# Patient Record
Sex: MALE | Race: WHITE | NOT HISPANIC OR LATINO | Employment: OTHER | URBAN - METROPOLITAN AREA
[De-identification: names, ages, dates, MRNs, and addresses within clinical notes are randomized per-mention and may not be internally consistent; named-entity substitution may affect disease eponyms.]

---

## 2017-03-14 ENCOUNTER — OFFICE VISIT (OUTPATIENT)
Dept: CARDIOLOGY | Facility: CLINIC | Age: 62
End: 2017-03-14

## 2017-03-14 VITALS
BODY MASS INDEX: 31.94 KG/M2 | HEART RATE: 64 BPM | DIASTOLIC BLOOD PRESSURE: 78 MMHG | SYSTOLIC BLOOD PRESSURE: 120 MMHG | WEIGHT: 241 LBS | HEIGHT: 73 IN

## 2017-03-14 DIAGNOSIS — I44.7 LBBB (LEFT BUNDLE BRANCH BLOCK): ICD-10-CM

## 2017-03-14 DIAGNOSIS — I48.19 PERSISTENT ATRIAL FIBRILLATION (HCC): Primary | ICD-10-CM

## 2017-03-14 DIAGNOSIS — R06.02 SHORTNESS OF BREATH: ICD-10-CM

## 2017-03-14 PROCEDURE — 93000 ELECTROCARDIOGRAM COMPLETE: CPT | Performed by: INTERNAL MEDICINE

## 2017-03-14 PROCEDURE — 99214 OFFICE O/P EST MOD 30 MIN: CPT | Performed by: INTERNAL MEDICINE

## 2017-03-17 NOTE — PROGRESS NOTES
Date of Office Visit: 2017  Encounter Provider: Catrachito Reynolds MD  Place of Service: [facility] CARDIOLOGY  Patient Name: Anjum Palma Jr  :1955    Chief complaint: Follow-up for persistent atrial fibrillation, LBBB    History of Present Illness:    Dear Vishnu:      I again had the pleasure of seeing your patient in cardiology office on 2017. As  you well know, he is a very pleasant 61 year-old white male with a past medical history  significant for pesistent atrial fibrillation and hypertension who presents for follow-up.  The patient was found to have an abnormal EKG during a routine physical on   2012.  He subsequently presented for an outpatient Cardiolite stress test on   2012. At that time, his heart rate was irregular and he was found to be in atrial   fibrillation by EKG.  He was completely unaware that he was in atrial fibrillation, and   was essentially asymptomatic. He did complete his stress test at that time, and the   scintigraphic portion showed no evidence of ischemia or infarction. He was initiated   on Xarelto, and saw me in the office on 2012 after his stress test. He was also   placed on Toprol XL, and subsequently underwent a transesophageal echocardiogram   on 2012. At the time of his VIOLET, he was noted to be in normal sinus rhythm. His   transesophageal echocardiogram showed mild septal hypokinesis and an ejection   fraction of 55-60%. He did not have any significant valvular anomalies and his left atrial   size was normal.      The patient presented back in atrial fibrillation at his office visit on 2013. At that   time, I advised him to undergo another transesophageal echocardiogram. He underwent  this examination on 2013 and it was negative for any thrombus formation. He was  cardioverted with a single 150 joules shock on a biphasic defibrillator. He was placed on  amiodarone for a short time  afterwards and was transitioned to Multaq. He again reverted  back to atrial fibrillation afterwards.      The patient presents today for follow-up.  He does state that while he was moving   furniture with his son recently, he did feel short of breath.  He has some mild shortness   of breath with heavy exertion.  However, he can still work out without significant difficulty.    He has not had any chest discomfort.  His EKG from today does show that he has   progressed to a full left bundle branch block.  He is still taking all his medications without   difficulty.    Past Medical History   Diagnosis Date   • Degeneration of cervical intervertebral disc    • Hypertension    • LBBB (left bundle branch block)      Diagnosed 3/14/17   • Nephrolithiasis      Recurrent, s/p multiple lithotripsy procedures   • CHARLY on CPAP    • Osteoarthritis    • Persistent atrial fibrillation    • Prostate cancer      Early stage prostate cancer diagnosed late 2014.  Completed 43 radiation treatments on 5/18/15.   • Seasonal allergic reaction        Past Surgical History   Procedure Laterality Date   • Other surgical history       Multiple Lithotripsy procedures       Current Outpatient Prescriptions on File Prior to Visit   Medication Sig Dispense Refill   • cyclobenzaprine (FLEXERIL) 10 MG tablet Take 1 tablet by mouth daily as needed.     • flunisolide (NASALIDE) 25 MCG/ACT (0.025%) solution nasal spray into each nostril.     • hydrochlorothiazide (HYDRODIURIL) 25 MG tablet Take 1 tablet by mouth daily.     • levocetirizine (XYZAL) 5 MG tablet Take 1 tablet by mouth daily.     • lidocaine (LIDODERM) 5 % Apply topically. Apply as directed     • metoprolol succinate XL (TOPROL-XL) 25 MG 24 hr tablet Take 25 mg by mouth daily.     • montelukast (SINGULAIR) 10 MG tablet Take 1 tablet by mouth daily.     • potassium citrate (UROCIT-K) 10 MEQ (1080 MG) CR tablet Take 10 mEq by mouth 3 (three) times a day with meals.     •  "trandolapril-verapamil (TARKA) 4-240 MG per CR tablet Take 1 tablet by mouth daily.     • XARELTO 20 MG tablet TAKE 1 TABLET DAILY WITH   DINNER 90 tablet 2     No current facility-administered medications on file prior to visit.      Allergies as of 03/14/2017   • (No Known Allergies)     Social History     Social History   • Marital status:      Spouse name: N/A   • Number of children: N/A   • Years of education: N/A     Occupational History   • Not on file.     Social History Main Topics   • Smoking status: Never Smoker   • Smokeless tobacco: Never Used   • Alcohol use Yes      Comment: Cut back on use significantly   • Drug use: No   • Sexual activity: Not on file     Other Topics Concern   • Not on file     Social History Narrative     Family History   Problem Relation Age of Onset   • Atrial fibrillation Mother       also s/p ICD placement   • Coronary artery disease Father      Father with CABG in his 50's, and had subsequent coronary stent placement.   • Uterine cancer Sister    • Heart failure Paternal Uncle        Review of Systems   Cardiovascular: Positive for dyspnea on exertion.   Musculoskeletal: Positive for joint pain.   Genitourinary: Positive for frequency.   All other systems reviewed and are negative.    Objective:     Vitals:    03/14/17 1127   BP: 120/78   Pulse: 64   Weight: 241 lb (109 kg)   Height: 73\" (185.4 cm)     Body mass index is 31.8 kg/(m^2).    Physical Exam   Constitutional: He is oriented to person, place, and time. He appears well-developed and well-nourished.   HENT:   Head: Normocephalic and atraumatic.   Eyes: Conjunctivae are normal.   Neck: Neck supple.   Cardiovascular: Normal rate.  An irregularly irregular rhythm present. Exam reveals no gallop and no friction rub.    No murmur heard.  Pulmonary/Chest: Effort normal and breath sounds normal.   Abdominal: Soft. There is no tenderness.   Musculoskeletal: He exhibits no edema.   Neurological: He is alert and oriented " to person, place, and time.   Skin: Skin is warm.   Psychiatric: He has a normal mood and affect. His behavior is normal.     Lab Review:     ECG 12 Lead  Date/Time: 3/14/2017 11:31 AM  Performed by: MAVIS RAINEY  Authorized by: MAVIS RAINEY   Comparison: compared with previous ECG from 12/19/2014  Comparison to previous ECG: LBBB new compared to old EKG  Rhythm: atrial fibrillation  Rate: normal  BPM: 64  Conduction: left bundle branch block  Clinical impression: abnormal ECG            Assessment:       Diagnosis Plan   1. Persistent atrial fibrillation  Adult Transthoracic Echo Complete   2. Shortness of breath  Adult Transthoracic Echo Complete   3. LBBB (left bundle branch block)  Adult Transthoracic Echo Complete     Plan:       Overall, the patient seems to be doing well.  He did have some shortness of breath while   moving furniture with his son recently, although he continues to be able to work out without   difficulty.  He has not had any chest discomfort his blood pressure is 120/78, and he states   that his heart rate is consistently in the 60s to 70s.  He is going to remain on the Toprol-XL   at 25 mg once a day, as well as the Tarka.  He is taking the Xarelto without any difficulty as   well, and has been on this for quite some time.  He has progressed to a full left bundle   branch block on his EKG.  He has a history of a left anterior fascicular block in the past.  I   am going to recheck an echocardiogram at this time as he has not had a study in quite  some time, and now has a new left bundle branch block on his EKG.  If he has any issues,   he will notify me.  Otherwise, I will see him back in 6 months.

## 2017-03-20 ENCOUNTER — HOSPITAL ENCOUNTER (OUTPATIENT)
Dept: CARDIOLOGY | Facility: HOSPITAL | Age: 62
Discharge: HOME | End: 2017-03-20
Attending: INTERNAL MEDICINE | Admitting: INTERNAL MEDICINE

## 2017-03-20 ENCOUNTER — TELEPHONE (OUTPATIENT)
Dept: CARDIOLOGY | Facility: CLINIC | Age: 62
End: 2017-03-20

## 2017-03-20 VITALS
SYSTOLIC BLOOD PRESSURE: 170 MMHG | HEIGHT: 73 IN | BODY MASS INDEX: 31.94 KG/M2 | WEIGHT: 241 LBS | HEART RATE: 75 BPM | DIASTOLIC BLOOD PRESSURE: 100 MMHG

## 2017-03-20 DIAGNOSIS — I44.7 LBBB (LEFT BUNDLE BRANCH BLOCK): ICD-10-CM

## 2017-03-20 DIAGNOSIS — R06.02 SHORTNESS OF BREATH: ICD-10-CM

## 2017-03-20 DIAGNOSIS — I48.19 PERSISTENT ATRIAL FIBRILLATION (HCC): ICD-10-CM

## 2017-03-20 LAB
ASCENDING AORTA: 4.1 CM
BH CV ECHO MEAS - ACS: 2.2 CM
BH CV ECHO MEAS - AO MAX PG (FULL): 3.4 MMHG
BH CV ECHO MEAS - AO MAX PG: 6.8 MMHG
BH CV ECHO MEAS - AO MEAN PG (FULL): 2.2 MMHG
BH CV ECHO MEAS - AO MEAN PG: 4.2 MMHG
BH CV ECHO MEAS - AO ROOT AREA (BSA CORRECTED): 2
BH CV ECHO MEAS - AO ROOT AREA: 17.1 CM^2
BH CV ECHO MEAS - AO ROOT DIAM: 4.7 CM
BH CV ECHO MEAS - AO V2 MAX: 129.9 CM/SEC
BH CV ECHO MEAS - AO V2 MEAN: 98.5 CM/SEC
BH CV ECHO MEAS - AO V2 VTI: 27.4 CM
BH CV ECHO MEAS - AVA(I,A): 2.3 CM^2
BH CV ECHO MEAS - AVA(I,D): 2.3 CM^2
BH CV ECHO MEAS - AVA(V,A): 2.7 CM^2
BH CV ECHO MEAS - AVA(V,D): 2.7 CM^2
BH CV ECHO MEAS - BSA(HAYCOCK): 2.4 M^2
BH CV ECHO MEAS - BSA: 2.3 M^2
BH CV ECHO MEAS - BZI_BMI: 31.8 KILOGRAMS/M^2
BH CV ECHO MEAS - BZI_METRIC_HEIGHT: 185.4 CM
BH CV ECHO MEAS - BZI_METRIC_WEIGHT: 109.3 KG
BH CV ECHO MEAS - CONTRAST EF (2CH): 58.1 ML/M^2
BH CV ECHO MEAS - CONTRAST EF 4CH: 55.6 ML/M^2
BH CV ECHO MEAS - EDV(MOD-SP2): 129 ML
BH CV ECHO MEAS - EDV(MOD-SP4): 135 ML
BH CV ECHO MEAS - EDV(TEICH): 192.3 ML
BH CV ECHO MEAS - EF(CUBED): 64.4 %
BH CV ECHO MEAS - EF(MOD-SP2): 58.1 %
BH CV ECHO MEAS - EF(MOD-SP4): 57 %
BH CV ECHO MEAS - EF(TEICH): 54.9 %
BH CV ECHO MEAS - ESV(MOD-SP2): 54 ML
BH CV ECHO MEAS - ESV(MOD-SP4): 60 ML
BH CV ECHO MEAS - ESV(TEICH): 86.7 ML
BH CV ECHO MEAS - FS: 29.1 %
BH CV ECHO MEAS - IVS/LVPW: 1.1
BH CV ECHO MEAS - IVSD: 1.2 CM
BH CV ECHO MEAS - LAT PEAK E' VEL: 9 CM/SEC
BH CV ECHO MEAS - LV DIASTOLIC VOL/BSA (35-75): 58 ML/M^2
BH CV ECHO MEAS - LV MASS(C)D: 328.8 GRAMS
BH CV ECHO MEAS - LV MASS(C)DI: 141.2 GRAMS/M^2
BH CV ECHO MEAS - LV MAX PG: 3.3 MMHG
BH CV ECHO MEAS - LV MEAN PG: 2 MMHG
BH CV ECHO MEAS - LV SYSTOLIC VOL/BSA (12-30): 25.8 ML/M^2
BH CV ECHO MEAS - LV V1 MAX: 91.2 CM/SEC
BH CV ECHO MEAS - LV V1 MEAN: 68.7 CM/SEC
BH CV ECHO MEAS - LV V1 VTI: 16.9 CM
BH CV ECHO MEAS - LVIDD: 6.2 CM
BH CV ECHO MEAS - LVIDS: 4.4 CM
BH CV ECHO MEAS - LVLD AP2: 7.6 CM
BH CV ECHO MEAS - LVLD AP4: 7.2 CM
BH CV ECHO MEAS - LVLS AP2: 6.6 CM
BH CV ECHO MEAS - LVLS AP4: 5.9 CM
BH CV ECHO MEAS - LVOT AREA (M): 3.8 CM^2
BH CV ECHO MEAS - LVOT AREA: 3.8 CM^2
BH CV ECHO MEAS - LVOT DIAM: 2.2 CM
BH CV ECHO MEAS - LVPWD: 1.2 CM
BH CV ECHO MEAS - MED PEAK E' VEL: 5 CM/SEC
BH CV ECHO MEAS - MR MAX PG: 64.1 MMHG
BH CV ECHO MEAS - MR MAX VEL: 400.4 CM/SEC
BH CV ECHO MEAS - MV DEC SLOPE: 390.7 CM/SEC^2
BH CV ECHO MEAS - MV DEC TIME: 0.21 SEC
BH CV ECHO MEAS - MV E MAX VEL: 84.9 CM/SEC
BH CV ECHO MEAS - MV MAX PG: 6 MMHG
BH CV ECHO MEAS - MV MEAN PG: 2.6 MMHG
BH CV ECHO MEAS - MV P1/2T MAX VEL: 83.4 CM/SEC
BH CV ECHO MEAS - MV P1/2T: 62.5 MSEC
BH CV ECHO MEAS - MV V2 MAX: 122.7 CM/SEC
BH CV ECHO MEAS - MV V2 MEAN: 71.3 CM/SEC
BH CV ECHO MEAS - MV V2 VTI: 15.6 CM
BH CV ECHO MEAS - MVA P1/2T LCG: 2.6 CM^2
BH CV ECHO MEAS - MVA(P1/2T): 3.5 CM^2
BH CV ECHO MEAS - MVA(VTI): 4.1 CM^2
BH CV ECHO MEAS - PA MAX PG (FULL): 0.95 MMHG
BH CV ECHO MEAS - PA MAX PG: 1.9 MMHG
BH CV ECHO MEAS - PA V2 MAX: 69.5 CM/SEC
BH CV ECHO MEAS - PVA(V,A): 3.5 CM^2
BH CV ECHO MEAS - PVA(V,D): 3.5 CM^2
BH CV ECHO MEAS - QP/QS: 0.64
BH CV ECHO MEAS - RAP SYSTOLE: 15 MMHG
BH CV ECHO MEAS - RV MAX PG: 0.98 MMHG
BH CV ECHO MEAS - RV MEAN PG: 0.48 MMHG
BH CV ECHO MEAS - RV V1 MAX: 49.5 CM/SEC
BH CV ECHO MEAS - RV V1 MEAN: 31.3 CM/SEC
BH CV ECHO MEAS - RV V1 VTI: 8.4 CM
BH CV ECHO MEAS - RVOT AREA: 4.9 CM^2
BH CV ECHO MEAS - RVOT DIAM: 2.5 CM
BH CV ECHO MEAS - RVSP: 42 MMHG
BH CV ECHO MEAS - SI(AO): 201 ML/M^2
BH CV ECHO MEAS - SI(CUBED): 65.1 ML/M^2
BH CV ECHO MEAS - SI(LVOT): 27.5 ML/M^2
BH CV ECHO MEAS - SI(MOD-SP2): 32.2 ML/M^2
BH CV ECHO MEAS - SI(MOD-SP4): 32.2 ML/M^2
BH CV ECHO MEAS - SI(TEICH): 45.3 ML/M^2
BH CV ECHO MEAS - SUP REN AO DIAM: 2.7 CM
BH CV ECHO MEAS - SV(AO): 468.2 ML
BH CV ECHO MEAS - SV(CUBED): 151.7 ML
BH CV ECHO MEAS - SV(LVOT): 64 ML
BH CV ECHO MEAS - SV(MOD-SP2): 75 ML
BH CV ECHO MEAS - SV(MOD-SP4): 75 ML
BH CV ECHO MEAS - SV(RVOT): 40.8 ML
BH CV ECHO MEAS - SV(TEICH): 105.6 ML
BH CV ECHO MEAS - TAPSE (>1.6): 2.6 CM2
BH CV ECHO MEAS - TR MAX VEL: 259.9 CM/SEC
BH CV XLRA - RV BASE: 3.8 CM
BH CV XLRA - TDI S': 12 CM/SEC
E/E' RATIO: 14
LEFT ATRIUM VOLUME INDEX: 43 ML/M2
SINUS: 4.3 CM
STJ: 3.1 CM

## 2017-03-20 PROCEDURE — 0399T HC MYOCARDL STRAIN IMAG QUAN ASSMT PER SESS: CPT

## 2017-03-20 PROCEDURE — 76376 3D RENDER W/INTRP POSTPROCES: CPT | Performed by: INTERNAL MEDICINE

## 2017-03-20 PROCEDURE — 0399T ADULT TRANSTHORACIC ECHO COMPLETE: CPT | Performed by: INTERNAL MEDICINE

## 2017-03-20 PROCEDURE — 93306 TTE W/DOPPLER COMPLETE: CPT | Performed by: INTERNAL MEDICINE

## 2017-03-20 PROCEDURE — 93306 TTE W/DOPPLER COMPLETE: CPT

## 2017-03-20 NOTE — TELEPHONE ENCOUNTER
Pt called  He missed your call about echo results.   Pt's call back # [phone]   Thanks Mani LOPEZ

## 2017-09-06 RX ORDER — RIVAROXABAN 20 MG/1
TABLET, FILM COATED ORAL
Qty: 90 TABLET | Refills: 3 | Status: SHIPPED | OUTPATIENT
Start: 2017-09-06 | End: 2018-07-31 | Stop reason: SDUPTHER

## 2017-09-11 ENCOUNTER — OFFICE VISIT (OUTPATIENT)
Dept: CARDIOLOGY | Facility: CLINIC | Age: 62
End: 2017-09-11

## 2017-09-11 VITALS
OXYGEN SATURATION: 98 % | WEIGHT: 243 LBS | HEART RATE: 74 BPM | SYSTOLIC BLOOD PRESSURE: 120 MMHG | HEIGHT: 73 IN | DIASTOLIC BLOOD PRESSURE: 82 MMHG | BODY MASS INDEX: 32.2 KG/M2

## 2017-09-11 DIAGNOSIS — I48.19 PERSISTENT ATRIAL FIBRILLATION (HCC): Primary | ICD-10-CM

## 2017-09-11 DIAGNOSIS — I44.7 LEFT BUNDLE BRANCH BLOCK: ICD-10-CM

## 2017-09-11 PROCEDURE — 99213 OFFICE O/P EST LOW 20 MIN: CPT | Performed by: INTERNAL MEDICINE

## 2017-09-11 RX ORDER — TAMSULOSIN HYDROCHLORIDE 0.4 MG/1
0.4 CAPSULE ORAL DAILY
COMMUNITY

## 2017-09-11 RX ORDER — SENNOSIDES 8.6 MG
1300 CAPSULE ORAL 2 TIMES DAILY
COMMUNITY

## 2017-09-17 NOTE — PROGRESS NOTES
Date of Office Visit: 2017  Encounter Provider: Catrachito Reynolds MD  Place of Service: [facility] CARDIOLOGY  Patient Name: Anjum Palma Jr  :1955    Chief complaint: Follow-up for persistent atrial fibrillation, LBBB    History of Present Illness:    Dear Vishnu:       I again had the pleasure of seeing your patient in cardiology office on 2017. As  you well know, he is a very pleasant 62 year-old white male with a past medical history  significant for pesistent atrial fibrillation and hypertension who presents for follow-up.  The patient was found to have an abnormal EKG during a routine physical on   2012.  He subsequently presented for an outpatient Cardiolite stress test on   2012. At that time, his heart rate was irregular and he was found to be in atrial   fibrillation by EKG.  He was completely unaware that he was in atrial fibrillation, and   was essentially asymptomatic. He did complete his stress test at that time, and the   scintigraphic portion showed no evidence of ischemia or infarction. He was initiated   on Xarelto, and saw me in the office on 2012 after his stress test. He was also   placed on Toprol XL, and subsequently underwent a transesophageal echocardiogram   on 2012. At the time of his VIOLET, he was noted to be in normal sinus rhythm. His   transesophageal echocardiogram showed mild septal hypokinesis and an ejection   fraction of 55-60%. He did not have any significant valvular anomalies and his left atrial   size was normal.       The patient presented back in atrial fibrillation at his office visit on 2013. At that   time, I advised him to undergo another transesophageal echocardiogram. He underwent  this examination on 2013 and it was negative for any thrombus formation. He was  cardioverted with a single 150 joules shock on a biphasic defibrillator. He was placed on  amiodarone for a short time  afterwards and was transitioned to Multaq. He again reverted  back to atrial fibrillation afterwards.       The patient presents today for follow-up.  He seems to be doing well.  He is working out   on an elliptical machine frequently, and is having no issues with this.  He is had no chest   pain or shortness of breath.  He remains in atrial fibrillation which is well-controlled.  He   is taking the Xarelto without any bleeding complications.    Past Medical History:   Diagnosis Date   • Degeneration of cervical intervertebral disc    • Hypertension    • LBBB (left bundle branch block)     Diagnosed 3/14/17   • Nephrolithiasis     Recurrent, s/p multiple lithotripsy procedures   • CHARLY on CPAP    • Osteoarthritis    • Persistent atrial fibrillation    • Prostate cancer     Early stage prostate cancer diagnosed late 2014.  Completed 43 radiation treatments on 5/18/15.   • Seasonal allergic reaction        Past Surgical History:   Procedure Laterality Date   • OTHER SURGICAL HISTORY      Multiple Lithotripsy procedures       Current Outpatient Prescriptions on File Prior to Visit   Medication Sig Dispense Refill   • cyclobenzaprine (FLEXERIL) 10 MG tablet Take 1 tablet by mouth daily as needed.     • flunisolide (NASALIDE) 25 MCG/ACT (0.025%) solution nasal spray into each nostril.     • Glucos-Chondroit-Hyaluron-MSM (GLUCOSAMINE CHONDROITIN JOINT PO) Take  by mouth.     • hydrochlorothiazide (HYDRODIURIL) 25 MG tablet Take 1 tablet by mouth daily.     • levocetirizine (XYZAL) 5 MG tablet Take 1 tablet by mouth daily.     • metoprolol succinate XL (TOPROL-XL) 25 MG 24 hr tablet Take 25 mg by mouth daily.     • montelukast (SINGULAIR) 10 MG tablet Take 1 tablet by mouth daily.     • potassium citrate (UROCIT-K) 10 MEQ (1080 MG) CR tablet Take 10 mEq by mouth 3 (three) times a day with meals.     • trandolapril-verapamil (TARKA) 4-240 MG per CR tablet Take 1 tablet by mouth daily.     • XARELTO 20 MG tablet Take 1 tablet  "by mouth  daily with dinner 90 tablet 3     No current facility-administered medications on file prior to visit.      Allergies as of 09/11/2017   • (No Known Allergies)     Social History     Social History   • Marital status:      Spouse name: N/A   • Number of children: N/A   • Years of education: N/A     Occupational History   • Not on file.     Social History Main Topics   • Smoking status: Never Smoker   • Smokeless tobacco: Never Used   • Alcohol use Yes      Comment: Cut back on use significantly   • Drug use: No   • Sexual activity: Not on file     Other Topics Concern   • Not on file     Social History Narrative     Family History   Problem Relation Age of Onset   • Atrial fibrillation Mother       also s/p ICD placement   • Coronary artery disease Father      Father with CABG in his 50's, and had subsequent coronary stent placement.   • Uterine cancer Sister    • Heart failure Paternal Uncle        Review of Systems   HENT: Positive for sore throat.    Respiratory: Positive for cough.    All other systems reviewed and are negative.    Objective:     Vitals:    09/11/17 1117   BP: 120/82   Pulse: 74   SpO2: 98%   Weight: 243 lb (110 kg)   Height: 73\" (185.4 cm)     Body mass index is 32.06 kg/(m^2).    Physical Exam   Constitutional: He is oriented to person, place, and time. He appears well-developed and well-nourished.   HENT:   Head: Normocephalic and atraumatic.   Eyes: Conjunctivae are normal.   Neck: Neck supple.   Cardiovascular: Normal rate.  An irregularly irregular rhythm present. Exam reveals no gallop and no friction rub.    No murmur heard.  Pulmonary/Chest: Effort normal and breath sounds normal.   Abdominal: Soft. There is no tenderness.   Musculoskeletal: He exhibits no edema.   Neurological: He is alert and oriented to person, place, and time.   Skin: Skin is warm.   Psychiatric: He has a normal mood and affect. His behavior is normal.     Lab Review:   Procedures    Cardiac " Procedures:  1.  Echocardiogram on 3/20/2017: The left ventricle was mildly dilated.  There was mild   LVH.  Ejection fraction was 50-55%.  There was mild tricuspid regurgitation.  The left   atrium was moderately dilated.  The right atrium was moderately to severely dilated.    Assessment:       Diagnosis Plan   1. Persistent atrial fibrillation     2. Left bundle branch block       Plan:       The patient seems to be stable from a cardiac standpoint this time.  He has had no new   symptoms.  He remains asymptomatic with regards to the atrial fibrillation.  He is taking   the Toprol-XL at 25 mg per day, and his rate is well-controlled.  He will continue on the   Xarelto at 20 mg per day, and he has had no bleeding complications.  His   echocardiogram from 3/20/2017 showed low normal function at 50-55% ejection   fraction, and no significant valvular disease.  For now, I did not change any of his   medications.  I will see him back in the office within the next 6 months unless other   issues arise.    Atrial Fibrillation and Atrial Flutter  Assessment  • The patient has persistent atrial fibrillation  • This is non-valvular in etiology  • The patient's CHADS2-VASc score is 1  • A [id number] score of 1 is considered an intermediate risk for a thromboembolic event  • Rivaroxaban prescribed    Plan  • Continue in atrial fibrillation with rate control  • Continue rivaroxaban for antithrombotic therapy, bleeding issues discussed  • Continue beta blocker for rate control    Subjective - Objective  • The average duration of atrial fibrillation episodes is >3 months

## 2017-11-21 ENCOUNTER — OFFICE VISIT (OUTPATIENT)
Dept: SLEEP MEDICINE | Facility: HOSPITAL | Age: 62
End: 2017-11-21
Attending: PSYCHIATRY & NEUROLOGY

## 2017-11-21 VITALS
OXYGEN SATURATION: 96 % | HEART RATE: 76 BPM | BODY MASS INDEX: 32.6 KG/M2 | SYSTOLIC BLOOD PRESSURE: 158 MMHG | WEIGHT: 246 LBS | DIASTOLIC BLOOD PRESSURE: 107 MMHG | HEIGHT: 73 IN

## 2017-11-21 DIAGNOSIS — G47.33 OSA (OBSTRUCTIVE SLEEP APNEA): Primary | ICD-10-CM

## 2017-11-21 PROCEDURE — G0463 HOSPITAL OUTPT CLINIC VISIT: HCPCS

## 2017-11-21 NOTE — PROGRESS NOTES
Anjum Palma   :1955   [id number]      DATE OF SERVICE: 2017     HISTORY: The patient is a 62 y.o. white male with moderately severe obstructive sleep apnea syndrome.     Polysomnography in the past has revealed apnea-hypopnea index of AHI 24.2 per sleep hour with minimum SpO2 of 70%. He is on auto CPAP therapy. During supine position he has very severe obstructive sleep apnea syndrome with Supine AHI of 30.3, in nonsupine position 3.5 per sleep hour, and during REM sleep AHI of 68.1 per sleep hour. He also has periodic limb movement disorder.     Now has a correct fitting mask and using CPAP therapy very regularly. Compliance data indicates excellent compliance with CPAP pressure of 9 cm of water with an average usage 7 hours and 14 minutes and using 100% of the time for more than 4 hours. The patient is losing weight. His [place] Sleepiness Scale score is 4. He is compliant and benefiting from it.     Past Medical History:   Diagnosis Date   • Degeneration of cervical intervertebral disc    • Hypertension    • LBBB (left bundle branch block)     Diagnosed 3/14/17   • Nephrolithiasis     Recurrent, s/p multiple lithotripsy procedures   • CHARLY on CPAP    • Osteoarthritis    • Persistent atrial fibrillation    • Prostate cancer     Early stage prostate cancer diagnosed late .  Completed 43 radiation treatments on 5/18/15.   • Seasonal allergic reaction        Current Outpatient Prescriptions:   •  acetaminophen (TYLENOL) 650 MG 8 hr tablet, Take 1,300 mg by mouth., Disp: , Rfl:   •  cyclobenzaprine (FLEXERIL) 10 MG tablet, Take 1 tablet by mouth daily as needed., Disp: , Rfl:   •  flunisolide (NASALIDE) 25 MCG/ACT (0.025%) solution nasal spray, into each nostril., Disp: , Rfl:   •  hydrochlorothiazide (HYDRODIURIL) 25 MG tablet, Take 1 tablet by mouth daily., Disp: , Rfl:   •  levocetirizine (XYZAL) 5 MG tablet, Take 1 tablet by mouth daily., Disp: , Rfl:   •  metoprolol succinate XL (TOPROL-XL)  "25 MG 24 hr tablet, Take 25 mg by mouth daily., Disp: , Rfl:   •  montelukast (SINGULAIR) 10 MG tablet, Take 1 tablet by mouth daily., Disp: , Rfl:   •  potassium citrate (UROCIT-K) 10 MEQ (1080 MG) CR tablet, Take 10 mEq by mouth 3 (three) times a day with meals., Disp: , Rfl:   •  tamsulosin (FLOMAX) 0.4 MG capsule 24 hr capsule, Take 0.4 mg by mouth., Disp: , Rfl:   •  trandolapril-verapamil (TARKA) 4-240 MG per CR tablet, Take 1 tablet by mouth daily., Disp: , Rfl:   •  XARELTO 20 MG tablet, Take 1 tablet by mouth  daily with dinner, Disp: 90 tablet, Rfl: 3  •  Glucos-Chondroit-Hyaluron-MSM (GLUCOSAMINE CHONDROITIN JOINT PO), Take  by mouth., Disp: , Rfl:     No Known Allergies    Review of Systems - Negative     PHYSICAL EXAMINATION:  Vitals:    11/21/17 0816   BP: (!) 158/107   Pulse: 76   SpO2: 96%   Weight: 246 lb (112 kg)   Height: 73\" (185.4 cm)     HEENT: Narrow oropharynx. Mallampati class III.   NECK: Supple. No bruits.   CARDIAC: Normal.   LUNGS: Clear to auscultation.   EXTREMITIES: No edema.     IMPRESSION: Patient with severe obstructive sleep apnea syndrome successfully treated with auto CPAP therapy and is compliant and benefiting from it.     RECOMMENDATIONS: Continue present auto CPAP. Followup in 1 year.       Cristi Dobbs M.D.  11/21/2017   "

## 2018-03-13 ENCOUNTER — OFFICE VISIT (OUTPATIENT)
Dept: CARDIOLOGY | Facility: CLINIC | Age: 63
End: 2018-03-13

## 2018-03-13 VITALS
HEART RATE: 64 BPM | HEIGHT: 73 IN | OXYGEN SATURATION: 92 % | WEIGHT: 239 LBS | SYSTOLIC BLOOD PRESSURE: 138 MMHG | DIASTOLIC BLOOD PRESSURE: 90 MMHG | BODY MASS INDEX: 31.68 KG/M2

## 2018-03-13 DIAGNOSIS — I10 ESSENTIAL HYPERTENSION: Primary | ICD-10-CM

## 2018-03-13 DIAGNOSIS — I48.19 PERSISTENT ATRIAL FIBRILLATION (HCC): ICD-10-CM

## 2018-03-13 DIAGNOSIS — I44.7 LBBB (LEFT BUNDLE BRANCH BLOCK): ICD-10-CM

## 2018-03-13 PROCEDURE — 99214 OFFICE O/P EST MOD 30 MIN: CPT | Performed by: INTERNAL MEDICINE

## 2018-03-13 RX ORDER — AMLODIPINE BESYLATE 5 MG/1
5 TABLET ORAL DAILY
Qty: 30 TABLET | Refills: 11 | Status: SHIPPED | OUTPATIENT
Start: 2018-03-13 | End: 2018-07-31 | Stop reason: SDUPTHER

## 2018-03-13 RX ORDER — SILDENAFIL CITRATE 20 MG/1
20 TABLET ORAL AS NEEDED
COMMUNITY
End: 2021-08-12 | Stop reason: ALTCHOICE

## 2018-03-13 RX ORDER — LOSARTAN POTASSIUM 50 MG/1
50 TABLET ORAL DAILY
Qty: 30 TABLET | Refills: 11 | Status: SHIPPED | OUTPATIENT
Start: 2018-03-13 | End: 2018-03-27 | Stop reason: SDUPTHER

## 2018-03-16 ENCOUNTER — TELEPHONE (OUTPATIENT)
Dept: CARDIOLOGY | Facility: CLINIC | Age: 63
End: 2018-03-16

## 2018-03-16 NOTE — TELEPHONE ENCOUNTER
Frances,    I spoke with him on the phone.  He is going to double up on the losartan to make the dose 100 mg per day.  I have a BMP set up for him on Monday.  Thanks     ЕЛЕНА

## 2018-03-16 NOTE — TELEPHONE ENCOUNTER
03/16/18  10:19 AM  Anjum Palma Jr  1955    Home Phone [phone]   Mobile [phone]     Mr. Palma is calling with BP readings after med changes were made at office visit on 3/13/18.   3/14: 167/117 HR 75  3/15: 157/119, HR 76  3/16: 169/109, HR 84    He is taking 5mg amlodipine daily, 50mg losartan daily, and 25mg HCTZ daily.    Does he need medication adjustment?    Frances DIAZ RN

## 2018-03-19 ENCOUNTER — LAB (OUTPATIENT)
Dept: LAB | Facility: HOSPITAL | Age: 63
End: 2018-03-19

## 2018-03-19 DIAGNOSIS — I10 ESSENTIAL HYPERTENSION: Primary | ICD-10-CM

## 2018-03-19 DIAGNOSIS — I10 ESSENTIAL HYPERTENSION: ICD-10-CM

## 2018-03-19 LAB
ANION GAP SERPL CALCULATED.3IONS-SCNC: 13.3 MMOL/L
BUN BLD-MCNC: 13 MG/DL (ref 8–23)
BUN/CREAT SERPL: 9.4 (ref 7–25)
CALCIUM SPEC-SCNC: 9.6 MG/DL (ref 8.6–10.5)
CHLORIDE SERPL-SCNC: 99 MMOL/L (ref 98–107)
CO2 SERPL-SCNC: 26.7 MMOL/L (ref 22–29)
CREAT BLD-MCNC: 1.38 MG/DL (ref 0.76–1.27)
GFR SERPL CREATININE-BSD FRML MDRD: 52 ML/MIN/1.73
GLUCOSE BLD-MCNC: 116 MG/DL (ref 65–99)
POTASSIUM BLD-SCNC: 4.1 MMOL/L (ref 3.5–5.2)
SODIUM BLD-SCNC: 139 MMOL/L (ref 136–145)

## 2018-03-19 PROCEDURE — 36415 COLL VENOUS BLD VENIPUNCTURE: CPT

## 2018-03-19 PROCEDURE — 80048 BASIC METABOLIC PNL TOTAL CA: CPT

## 2018-03-27 ENCOUNTER — CLINICAL SUPPORT (OUTPATIENT)
Dept: CARDIOLOGY | Facility: CLINIC | Age: 63
End: 2018-03-27

## 2018-03-27 ENCOUNTER — LAB (OUTPATIENT)
Dept: LAB | Facility: HOSPITAL | Age: 63
End: 2018-03-27

## 2018-03-27 DIAGNOSIS — I10 ESSENTIAL HYPERTENSION: ICD-10-CM

## 2018-03-27 DIAGNOSIS — I10 ESSENTIAL HYPERTENSION: Primary | ICD-10-CM

## 2018-03-27 LAB
ANION GAP SERPL CALCULATED.3IONS-SCNC: 12.7 MMOL/L
BUN BLD-MCNC: 16 MG/DL (ref 8–23)
BUN/CREAT SERPL: 13.9 (ref 7–25)
CALCIUM SPEC-SCNC: 9.7 MG/DL (ref 8.6–10.5)
CHLORIDE SERPL-SCNC: 99 MMOL/L (ref 98–107)
CO2 SERPL-SCNC: 29.3 MMOL/L (ref 22–29)
CREAT BLD-MCNC: 1.15 MG/DL (ref 0.76–1.27)
GFR SERPL CREATININE-BSD FRML MDRD: 64 ML/MIN/1.73
GLUCOSE BLD-MCNC: 94 MG/DL (ref 65–99)
POTASSIUM BLD-SCNC: 3.8 MMOL/L (ref 3.5–5.2)
SODIUM BLD-SCNC: 141 MMOL/L (ref 136–145)

## 2018-03-27 PROCEDURE — 80048 BASIC METABOLIC PNL TOTAL CA: CPT

## 2018-03-27 PROCEDURE — 36415 COLL VENOUS BLD VENIPUNCTURE: CPT

## 2018-03-27 RX ORDER — EPLERENONE 25 MG/1
25 TABLET, FILM COATED ORAL DAILY
Qty: 30 TABLET | Refills: 11 | Status: SHIPPED | OUTPATIENT
Start: 2018-03-27 | End: 2018-04-24

## 2018-03-27 RX ORDER — LOSARTAN POTASSIUM 100 MG/1
100 TABLET ORAL DAILY
Qty: 30 TABLET | Refills: 11 | Status: SHIPPED | OUTPATIENT
Start: 2018-03-27 | End: 2018-07-31 | Stop reason: SDUPTHER

## 2018-03-27 NOTE — PROGRESS NOTES
Patient came in today for a B/p check.  Patient said he feels fine and denies headaches, SOA, or any chest pain.  His b/p today was 150/100 pulse 60.  Dr. Reynolds said he will review patient's labs that he had today and call him today to discuss b/p and medications.  Patient said ok.  Patient said his b/p reading the last week or two have been: 167/117; 151/119; 169/109; 162/115; 139/105; 134/93; 160/106; 147/98; 145/101.  Patient did request that if he stays on Losartan 50mg twice daily that we send in a new RX to his [facility] pharmacy. Simi

## 2018-04-24 ENCOUNTER — OFFICE VISIT (OUTPATIENT)
Dept: CARDIOLOGY | Facility: CLINIC | Age: 63
End: 2018-04-24

## 2018-04-24 VITALS
HEIGHT: 73 IN | RESPIRATION RATE: 18 BRPM | BODY MASS INDEX: 32.2 KG/M2 | HEART RATE: 80 BPM | DIASTOLIC BLOOD PRESSURE: 80 MMHG | WEIGHT: 243 LBS | SYSTOLIC BLOOD PRESSURE: 138 MMHG

## 2018-04-24 DIAGNOSIS — I48.19 PERSISTENT ATRIAL FIBRILLATION (HCC): ICD-10-CM

## 2018-04-24 DIAGNOSIS — I10 HYPERTENSION, UNSPECIFIED TYPE: Primary | ICD-10-CM

## 2018-04-24 DIAGNOSIS — I44.7 LEFT BUNDLE BRANCH BLOCK: ICD-10-CM

## 2018-04-24 PROCEDURE — 99214 OFFICE O/P EST MOD 30 MIN: CPT | Performed by: INTERNAL MEDICINE

## 2018-04-24 RX ORDER — EPLERENONE 50 MG/1
50 TABLET, FILM COATED ORAL DAILY
Qty: 30 TABLET | Refills: 11 | Status: SHIPPED | OUTPATIENT
Start: 2018-04-24 | End: 2018-07-31 | Stop reason: SDUPTHER

## 2018-04-29 NOTE — PROGRESS NOTES
Date of Office Visit: 2018  Encounter Provider: Catrachito Reynolds MD  Place of Service: [facility] CARDIOLOGY  Patient Name: Anjum Palma Jr  :1955    Chief complaint: Follow-up for persistent atrial fibrillation, hypertension, and left   bundle branch block.    History of Present Illness:    Dear Vishnu:      I again had the pleasure of seeing your patient in cardiology office on 2018. As you   well know, he is a very pleasant 62 year-old white male with a past medical history  significant for pesistent atrial fibrillation and hypertension who presents for follow-up.  The patient was found to have an abnormal EKG during a routine physical on 2012.    He subsequently presented for an outpatient Cardiolite stress test on 2012. At that   time, his heart rate was irregular and he was found to be in atrial fibrillation by EKG.  He   was completely unaware that he was in atrial fibrillation, and was essentially   asymptomatic. He did complete his stress test at that time, and the scintigraphic portion   showed no evidence of ischemia or infarction. He was initiated on Xarelto, and saw me   in the office on 2012 after his stress test. He was also placed on Toprol XL, and   subsequently underwent a transesophageal echocardiogram on 2012. At the time   of his VIOLET, he was noted to be in normal sinus rhythm. His transesophageal   echocardiogram showed mild septal hypokinesis and an ejection fraction of 55-60%. He   did not have any significant valvular anomalies and his left atrial size was normal.      The patient presented back in atrial fibrillation at his office visit on 2013. At that time,   I advised him to undergo another transesophageal echocardiogram. He underwent this   examination on 2013 and it was negative for any thrombus formation. He was  cardioverted with a single 150 joules shock on a biphasic defibrillator. He was  placed on  amiodarone for a short time afterwards and was transitioned to Multaq. He again   reverted back to atrial fibrillation afterwards.      The patient presents today for follow-up.  His main issue recently has been his blood   pressure.  At his last visit, I had stopped his Tarka, and switched him to amlodipine and   losartan.  Since that time, the losartan has been increased, and Inspra has been added.    His blood pressure is finally starting to improve somewhat, and is 138/80 today.  It had   been quite high previously.  He has been asymptomatic with regards to the atrial   fibrillation.  He denied any chest pain or shortness of breath.    Past Medical History:   Diagnosis Date   • Degeneration of cervical intervertebral disc    • Hypertension    • LBBB (left bundle branch block)     Diagnosed 3/14/17   • Nephrolithiasis     Recurrent, s/p multiple lithotripsy procedures   • CHARLY on CPAP    • Osteoarthritis    • Persistent atrial fibrillation    • Prostate cancer     Early stage prostate cancer diagnosed late 2014.  Completed 43 radiation treatments on 5/18/15.   • Seasonal allergic reaction        Past Surgical History:   Procedure Laterality Date   • OTHER SURGICAL HISTORY      Multiple Lithotripsy procedures       Current Outpatient Prescriptions on File Prior to Visit   Medication Sig Dispense Refill   • acetaminophen (TYLENOL) 650 MG 8 hr tablet Take 1,300 mg by mouth 2 (Two) Times a Day.     • amLODIPine (NORVASC) 5 MG tablet Take 1 tablet by mouth Daily. 30 tablet 11   • cyclobenzaprine (FLEXERIL) 10 MG tablet Take 1 tablet by mouth daily as needed.     • flunisolide (NASALIDE) 25 MCG/ACT (0.025%) solution nasal spray into each nostril Daily.     • hydrochlorothiazide (HYDRODIURIL) 25 MG tablet Take 1 tablet by mouth daily.     • losartan (COZAAR) 100 MG tablet Take 1 tablet by mouth Daily. 30 tablet 11   • metoprolol succinate XL (TOPROL-XL) 25 MG 24 hr tablet Take 25 mg by mouth daily.     •  "montelukast (SINGULAIR) 10 MG tablet Take 1 tablet by mouth daily.     • potassium citrate (UROCIT-K) 10 MEQ (1080 MG) CR tablet Take 10 mEq by mouth 3 (three) times a day with meals.     • sildenafil (REVATIO) 20 MG tablet Take 20 mg by mouth.     • tamsulosin (FLOMAX) 0.4 MG capsule 24 hr capsule Take 0.4 mg by mouth.     • XARELTO 20 MG tablet Take 1 tablet by mouth  daily with dinner 90 tablet 3     No current facility-administered medications on file prior to visit.      Allergies as of 04/24/2018   • (No Known Allergies)     Social History     Social History   • Marital status:      Spouse name: N/A   • Number of children: N/A   • Years of education: N/A     Occupational History   • Not on file.     Social History Main Topics   • Smoking status: Never Smoker   • Smokeless tobacco: Never Used   • Alcohol use Yes      Comment: Cut back on use significantly   • Drug use: No   • Sexual activity: Not on file     Other Topics Concern   • Not on file     Social History Narrative   • No narrative on file     Family History   Problem Relation Age of Onset   • Atrial fibrillation Mother       also s/p ICD placement   • Coronary artery disease Father      Father with CABG in his 50's, and had subsequent coronary stent placement.   • Uterine cancer Sister    • Heart failure Paternal Uncle        Review of Systems   Musculoskeletal: Positive for joint pain.   All other systems reviewed and are negative.     Objective:     Vitals:    04/24/18 1308   BP: 138/80   Pulse: 80   Resp: 18   Weight: 110 kg (243 lb)   Height: 185.4 cm (73\")     Body mass index is 32.06 kg/m².    Physical Exam   Constitutional: He is oriented to person, place, and time. He appears well-developed and well-nourished.   HENT:   Head: Normocephalic and atraumatic.   Eyes: Conjunctivae are normal.   Neck: Neck supple.   Cardiovascular: Normal rate.  An irregularly irregular rhythm present. Exam reveals no gallop and no friction rub.    No murmur " heard.  Pulmonary/Chest: Effort normal and breath sounds normal.   Abdominal: Soft. There is no tenderness.   Musculoskeletal: He exhibits no edema.   Neurological: He is alert and oriented to person, place, and time.   Skin: Skin is warm.   Psychiatric: He has a normal mood and affect. His behavior is normal.     Lab Review:   Procedures    Cardiac Procedures:  1.  Echocardiogram on 3/20/2017: The left ventricle was mildly dilated.  There was mild   LVH.  Ejection fraction was 50-55%.  There was mild tricuspid regurgitation.  The left   atrium was moderately dilated.  The right atrium was moderately to severely dilated.    Assessment:       Diagnosis Plan   1. Hypertension, unspecified type  Basic Metabolic Panel   2. Persistent atrial fibrillation     3. Left bundle branch block       Plan:       Again, the patient's main issue recently has been his hypertension.  At his last visit, I cut   the Tarka out completely and started amlodipine and losartan.  The losartan is now 100   mg per day, and the amlodipine at 5 mg per day.  This was in addition to the HCTZ at 25   mg per day and Toprol-XL at 25 mg per day.  His blood pressure remained elevated, and   he actually was started on eplerenone 25 mg per day several weeks later.  His renal   function and potassium have tolerated this.  His blood pressure is finally coming down,   but is still elevated.  I am going to increase the eplerenone to 50 mg per day.  He will   need a basic metabolic panel in one to 2 weeks.  He is stable from an atrial fibrillation   standpoint with a controlled rate.  He will continue on the Xarelto, and has had no   bleeding with this thus far.  I will have him return to the office within 3 months, and I will   adjust his blood pressure medications further after his BMP.    Atrial Fibrillation and Atrial Flutter  Assessment  • The patient has persistent atrial fibrillation  • This is non-valvular in etiology  • The patient's CHADS2-VASc  score is 1  • A [id number] score of 1 is considered an intermediate risk for a thromboembolic event  • Rivaroxaban prescribed    Plan  • Continue in atrial fibrillation with rate control  • Continue rivaroxaban for antithrombotic therapy, bleeding issues discussed  • Continue beta blocker for rate control    Subjective - Objective  • The average duration of atrial fibrillation episodes is >3 months

## 2018-05-08 ENCOUNTER — LAB (OUTPATIENT)
Dept: LAB | Facility: HOSPITAL | Age: 63
End: 2018-05-08

## 2018-05-08 ENCOUNTER — CLINICAL SUPPORT (OUTPATIENT)
Dept: CARDIOLOGY | Facility: CLINIC | Age: 63
End: 2018-05-08

## 2018-05-08 VITALS — SYSTOLIC BLOOD PRESSURE: 118 MMHG | DIASTOLIC BLOOD PRESSURE: 84 MMHG

## 2018-05-08 DIAGNOSIS — Z01.30 BLOOD PRESSURE CHECK: Primary | ICD-10-CM

## 2018-05-08 DIAGNOSIS — I10 HYPERTENSION, UNSPECIFIED TYPE: ICD-10-CM

## 2018-05-08 LAB
ANION GAP SERPL CALCULATED.3IONS-SCNC: 14 MMOL/L
BUN BLD-MCNC: 12 MG/DL (ref 8–23)
BUN/CREAT SERPL: 9 (ref 7–25)
CALCIUM SPEC-SCNC: 9 MG/DL (ref 8.6–10.5)
CHLORIDE SERPL-SCNC: 97 MMOL/L (ref 98–107)
CO2 SERPL-SCNC: 28 MMOL/L (ref 22–29)
CREAT BLD-MCNC: 1.34 MG/DL (ref 0.76–1.27)
GFR SERPL CREATININE-BSD FRML MDRD: 54 ML/MIN/1.73
GLUCOSE BLD-MCNC: 131 MG/DL (ref 65–99)
POTASSIUM BLD-SCNC: 4.1 MMOL/L (ref 3.5–5.2)
SODIUM BLD-SCNC: 139 MMOL/L (ref 136–145)

## 2018-05-08 PROCEDURE — 80048 BASIC METABOLIC PNL TOTAL CA: CPT

## 2018-05-08 PROCEDURE — 36415 COLL VENOUS BLD VENIPUNCTURE: CPT

## 2018-05-08 RX ORDER — EPLERENONE 25 MG/1
25 TABLET, FILM COATED ORAL
COMMUNITY
End: 2018-07-31 | Stop reason: DRUGHIGH

## 2018-05-08 RX ORDER — AZELASTINE 1 MG/ML
2 SPRAY, METERED NASAL 2 TIMES DAILY
COMMUNITY

## 2018-05-08 RX ORDER — CEFDINIR 300 MG/1
300 CAPSULE ORAL
COMMUNITY
Start: 2018-05-07 | End: 2018-05-17

## 2018-05-08 RX ORDER — LEVOCETIRIZINE DIHYDROCHLORIDE 5 MG/1
5 TABLET, FILM COATED ORAL EVERY EVENING
COMMUNITY

## 2018-05-08 NOTE — PROGRESS NOTES
Patient presented to the office to monitor blood pressure due to medication change. Patient vitals have been recorded.

## 2018-05-09 ENCOUNTER — TELEPHONE (OUTPATIENT)
Dept: CARDIOLOGY | Facility: CLINIC | Age: 63
End: 2018-05-09

## 2018-05-09 NOTE — TELEPHONE ENCOUNTER
Spoke with patient and he is aware that Dr. Reynolds also reviewed his labs and improved blood pressure. The patient wrote down his follow up appointment. AL    ----- Message from Catrachito Reynolds MD sent at 5/8/2018  4:25 PM EDT -----  Leny,    Would you mind letting him know that I also looked at his BP and labs.  His BP is much better.  Labs look good.  I am content with keeping his meds the same for now.  He has follow-up with me on 7/31/18.  Thanks    Layton      ----- Message -----  From: Jessie Li MA  Sent: 5/8/2018  10:57 AM  To: Catrachito Reynolds MD

## 2018-06-11 RX ORDER — METOPROLOL SUCCINATE 25 MG/1
TABLET, EXTENDED RELEASE ORAL
Qty: 270 TABLET | Refills: 3 | Status: SHIPPED | OUTPATIENT
Start: 2018-06-11 | End: 2019-06-04 | Stop reason: SDUPTHER

## 2018-07-31 ENCOUNTER — OFFICE VISIT (OUTPATIENT)
Dept: CARDIOLOGY | Facility: CLINIC | Age: 63
End: 2018-07-31

## 2018-07-31 VITALS
WEIGHT: 244.4 LBS | OXYGEN SATURATION: 96 % | HEIGHT: 73 IN | SYSTOLIC BLOOD PRESSURE: 128 MMHG | HEART RATE: 64 BPM | DIASTOLIC BLOOD PRESSURE: 82 MMHG | BODY MASS INDEX: 32.39 KG/M2

## 2018-07-31 DIAGNOSIS — I44.7 LBBB (LEFT BUNDLE BRANCH BLOCK): ICD-10-CM

## 2018-07-31 DIAGNOSIS — I10 ESSENTIAL HYPERTENSION: ICD-10-CM

## 2018-07-31 DIAGNOSIS — I48.19 PERSISTENT ATRIAL FIBRILLATION (HCC): Primary | ICD-10-CM

## 2018-07-31 PROCEDURE — 99213 OFFICE O/P EST LOW 20 MIN: CPT | Performed by: INTERNAL MEDICINE

## 2018-07-31 RX ORDER — LOSARTAN POTASSIUM 100 MG/1
100 TABLET ORAL DAILY
Qty: 90 TABLET | Refills: 3 | Status: SHIPPED | OUTPATIENT
Start: 2018-07-31 | End: 2018-08-31 | Stop reason: SDUPTHER

## 2018-07-31 RX ORDER — AMLODIPINE BESYLATE 5 MG/1
5 TABLET ORAL DAILY
Qty: 90 TABLET | Refills: 3 | Status: SHIPPED | OUTPATIENT
Start: 2018-07-31 | End: 2018-08-31 | Stop reason: SDUPTHER

## 2018-07-31 RX ORDER — EPLERENONE 50 MG/1
50 TABLET, FILM COATED ORAL DAILY
Qty: 90 TABLET | Refills: 3 | Status: SHIPPED | OUTPATIENT
Start: 2018-07-31 | End: 2018-08-31 | Stop reason: SDUPTHER

## 2018-08-05 NOTE — PROGRESS NOTES
Date of Office Visit: 2018  Encounter Provider: Catrachito Reynolds MD  Place of Service: [facility] CARDIOLOGY  Patient Name: Anjum Palma Jr  :1955    Chief complaint: Follow-up for persistent atrial fibrillation, hypertension, and left   bundle branch block.    History of Present Illness:    Dear Vishnu:      I again had the pleasure of seeing your patient in cardiology office on 2018. As you   well know, he is a very pleasant 63 year-old white male with a past medical history  significant for pesistent atrial fibrillation and hypertension who presents for follow-up.  The patient was found to have an abnormal EKG during a routine physical on 2012.    He subsequently presented for an outpatient Cardiolite stress test on 2012. At that   time, his heart rate was irregular and he was found to be in atrial fibrillation by EKG.  He   was completely unaware that he was in atrial fibrillation, and was essentially   asymptomatic. He did complete his stress test at that time, and the scintigraphic portion   showed no evidence of ischemia or infarction. He was initiated on Xarelto, and saw me   in the office on 2012 after his stress test. He was also placed on Toprol XL, and   subsequently underwent a transesophageal echocardiogram on 2012. At the time   of his VIOLET, he was noted to be in normal sinus rhythm. His transesophageal   echocardiogram showed mild septal hypokinesis and an ejection fraction of 55-60%. He   did not have any significant valvular anomalies and his left atrial size was normal.      The patient presented back in atrial fibrillation at his office visit on 2013. At that time,   I advised him to undergo another transesophageal echocardiogram. He underwent this   examination on 2013 and it was negative for any thrombus formation. He was  cardioverted with a single 150 joules shock on a biphasic defibrillator. He was  placed on  amiodarone for a short time afterwards and was transitioned to Multaq. He again   reverted back to atrial fibrillation afterwards.      The patient presents today for follow-up.  His blood pressure has been under much   better control recently.  He has been able to work out without any difficulty, and feels   well.  He has not had any chest discomfort.  He is asymptomatic with regards to the   atrial fibrillation.    Past Medical History:   Diagnosis Date   • Degeneration of cervical intervertebral disc    • Hypertension    • LBBB (left bundle branch block)     Diagnosed 3/14/17   • Nephrolithiasis     Recurrent, s/p multiple lithotripsy procedures   • CHARLY on CPAP    • Osteoarthritis    • Persistent atrial fibrillation (CMS/HCC)    • Prostate cancer (CMS/HCC)     Early stage prostate cancer diagnosed late 2014.  Completed 43 radiation treatments on 5/18/15.   • Seasonal allergic reaction        Past Surgical History:   Procedure Laterality Date   • OTHER SURGICAL HISTORY      Multiple Lithotripsy procedures       Current Outpatient Prescriptions on File Prior to Visit   Medication Sig Dispense Refill   • acetaminophen (TYLENOL) 650 MG 8 hr tablet Take 1,300 mg by mouth 2 (Two) Times a Day.     • azelastine (ASTELIN) 0.1 % nasal spray 2 sprays into each nostril 2 (Two) Times a Day. Use in each nostril as directed     • cyclobenzaprine (FLEXERIL) 10 MG tablet Take 1 tablet by mouth daily as needed.     • flunisolide (NASALIDE) 25 MCG/ACT (0.025%) solution nasal spray into each nostril Daily.     • hydrochlorothiazide (HYDRODIURIL) 25 MG tablet Take 1 tablet by mouth daily.     • levocetirizine (XYZAL) 5 MG tablet Take 5 mg by mouth Every Evening.     • metoprolol succinate XL (TOPROL-XL) 25 MG 24 hr tablet TAKE 2 TABLETS BY MOUTH IN  THE MORNING AND 1 TABLET IN THE EVENING 270 tablet 3   • montelukast (SINGULAIR) 10 MG tablet Take 1 tablet by mouth daily.     • Multiple Vitamin (ONE-A-DAY MENS PO) Take 1  "tablet by mouth Daily.     • potassium citrate (UROCIT-K) 10 MEQ (1080 MG) CR tablet Take 10 mEq by mouth 2 (Two) Times a Day With Meals.     • sildenafil (REVATIO) 20 MG tablet Take 20 mg by mouth.     • tamsulosin (FLOMAX) 0.4 MG capsule 24 hr capsule Take 0.4 mg by mouth.       No current facility-administered medications on file prior to visit.      Allergies as of 07/31/2018   • (No Known Allergies)     Social History     Social History   • Marital status:      Spouse name: N/A   • Number of children: N/A   • Years of education: N/A     Occupational History   • Not on file.     Social History Main Topics   • Smoking status: Never Smoker   • Smokeless tobacco: Never Used   • Alcohol use Yes      Comment: Cut back on use significantly   • Drug use: No   • Sexual activity: Not on file     Other Topics Concern   • Not on file     Social History Narrative   • No narrative on file     Family History   Problem Relation Age of Onset   • Atrial fibrillation Mother          also s/p ICD placement   • Coronary artery disease Father         Father with CABG in his 50's, and had subsequent coronary stent placement.   • Uterine cancer Sister    • Heart failure Paternal Uncle        Review of Systems   Respiratory: Positive for cough.    Musculoskeletal: Positive for joint pain.   All other systems reviewed and are negative.     Objective:     Vitals:    07/31/18 1122   BP: 128/82   Pulse: 64   SpO2: 96%   Weight: 111 kg (244 lb 6.4 oz)   Height: 185.4 cm (72.99\")     Body mass index is 32.25 kg/m².    Physical Exam   Constitutional: He is oriented to person, place, and time. He appears well-developed and well-nourished.   HENT:   Head: Normocephalic and atraumatic.   Eyes: Conjunctivae are normal.   Neck: Neck supple.   Cardiovascular: Normal rate.  An irregularly irregular rhythm present. Exam reveals no gallop and no friction rub.    No murmur heard.  Pulmonary/Chest: Effort normal and breath sounds normal. "   Abdominal: Soft. There is no tenderness.   Musculoskeletal: He exhibits no edema.   Neurological: He is alert and oriented to person, place, and time.   Skin: Skin is warm.   Psychiatric: He has a normal mood and affect. His behavior is normal.     Lab Review:   Procedures    Cardiac Procedures:  1.  Echocardiogram on 3/20/2017: The left ventricle was mildly dilated.  There was mild   LVH.  Ejection fraction was 50-55%.  There was mild tricuspid regurgitation.  The left   atrium was moderately dilated.  The right atrium was moderately to severely dilated.    Assessment:       Diagnosis Plan   1. Persistent atrial fibrillation (CMS/HCC)     2. Essential hypertension     3. LBBB (left bundle branch block)       Plan:       The patient seems to be stable at this point.  His blood pressure is 128/82.  This is the best   that it has been in quite some time.  He is currently on Toprol-XL at 25 mg per day, losartan   100 mg per day, Inspra 50 mg per day, and amlodipine 5 mg per day.  We discussed the   possibility of weight loss, which would likely get him into good range.  I am not going to add   any other medications currently.  He will continue on the Xarelto, and he has had no   bleeding issues thus far.  He does have a family history of coronary artery disease, and I   advised him to let me know if he has any significant symptoms since his chest discomfort   or dyspnea in the future.  Currently, he is asymptomatic.  I will see him back in the office in   4 months.    Atrial Fibrillation and Atrial Flutter  Assessment  • The patient has persistent atrial fibrillation  • This is non-valvular in etiology  • The patient's CHADS2-VASc score is 1  • A [id number] score of 1 is considered an intermediate risk for a thromboembolic event  • Rivaroxaban prescribed    Plan  • Continue in atrial fibrillation with rate control  • Continue rivaroxaban for antithrombotic therapy, bleeding issues discussed  • Continue beta blocker  for rate control    Subjective - Objective  • The average duration of atrial fibrillation episodes is >3 months

## 2018-08-31 RX ORDER — LOSARTAN POTASSIUM 100 MG/1
100 TABLET ORAL DAILY
Qty: 90 TABLET | Refills: 3 | Status: SHIPPED | OUTPATIENT
Start: 2018-08-31 | End: 2019-06-04 | Stop reason: SDUPTHER

## 2018-08-31 RX ORDER — EPLERENONE 50 MG/1
50 TABLET, FILM COATED ORAL DAILY
Qty: 90 TABLET | Refills: 3 | Status: SHIPPED | OUTPATIENT
Start: 2018-08-31 | End: 2019-06-04 | Stop reason: SDUPTHER

## 2018-08-31 RX ORDER — AMLODIPINE BESYLATE 5 MG/1
5 TABLET ORAL DAILY
Qty: 90 TABLET | Refills: 3 | Status: SHIPPED | OUTPATIENT
Start: 2018-08-31 | End: 2019-06-04 | Stop reason: SDUPTHER

## 2018-10-29 RX ORDER — RIVAROXABAN 20 MG/1
TABLET, FILM COATED ORAL
Qty: 90 TABLET | Refills: 3 | Status: SHIPPED | OUTPATIENT
Start: 2018-10-29 | End: 2019-06-04 | Stop reason: SDUPTHER

## 2018-11-20 ENCOUNTER — OFFICE VISIT (OUTPATIENT)
Dept: SLEEP MEDICINE | Facility: HOSPITAL | Age: 63
End: 2018-11-20
Attending: PSYCHIATRY & NEUROLOGY

## 2018-11-20 VITALS
OXYGEN SATURATION: 99 % | BODY MASS INDEX: 31.07 KG/M2 | TEMPERATURE: 97.9 F | HEIGHT: 73 IN | DIASTOLIC BLOOD PRESSURE: 81 MMHG | SYSTOLIC BLOOD PRESSURE: 111 MMHG | WEIGHT: 234.4 LBS | HEART RATE: 85 BPM

## 2018-11-20 DIAGNOSIS — G47.33 OSA (OBSTRUCTIVE SLEEP APNEA): Primary | ICD-10-CM

## 2018-11-20 PROCEDURE — G0463 HOSPITAL OUTPT CLINIC VISIT: HCPCS

## 2018-11-20 NOTE — PROGRESS NOTES
"Anjum Palma Jr  :1955   [id number]    DATE OF SERVICE: 2018     HISTORY: The patient is a 63 y.o. white male with moderately severe obstructive sleep apnea syndrome.     Polysomnography in the past has revealed apnea-hypopnea index of AHI 24.2 per sleep hour with minimum SpO2 of 70%. He is on auto CPAP therapy. During supine position he has very severe obstructive sleep apnea syndrome with Supine AHI of 30.3, in nonsupine position 3.5 per sleep hour, and during REM sleep AHI of 68.1 per sleep hour. He also has periodic limb movement disorder.     Now has a correct fitting mask and using CPAP therapy very regularly. Compliance data indicates excellent compliance with CPAP pressure of 9 cm of water with an average usage 7 hours and 45 minutes and using 100% of the time for more than 4 hours. The patient is losing weight. His [place] Sleepiness Scale score is 1. He is compliant and benefiting from it.  Residual AHI 7.9.    PMH, PSH, Medications, allergies, FH, SH are reviewed and updated in the chart.     No Known Allergies    10 Review of Systems - Negative except for cough.     PHYSICAL EXAMINATION:  Vitals:    18 0815   BP: 111/81   BP Location: Left arm   Patient Position: Sitting   Pulse: 85   Temp: 97.9 °F (36.6 °C)   TempSrc: Oral   SpO2: 99%   Weight: 106 kg (234 lb 6.4 oz)   Height: 185.4 cm (73\")   PainSc: 4  Comment: Kidney Stone     HEENT: Narrow oropharynx. Mallampati class III.   NECK: Supple. No bruits.   CARDIAC: Normal.   LUNGS: Clear to auscultation.   EXTREMITIES: No edema.     IMPRESSION: Patient with severe obstructive sleep apnea syndrome successfully treated with auto CPAP therapy and is compliant and benefiting from it.     RECOMMENDATIONS: Continue present auto CPAP. Followup in 1 year.     Cristi Dobbs M.D.  2018   "

## 2019-06-04 ENCOUNTER — OFFICE VISIT (OUTPATIENT)
Dept: CARDIOLOGY | Facility: CLINIC | Age: 64
End: 2019-06-04

## 2019-06-04 VITALS
HEART RATE: 82 BPM | WEIGHT: 230.6 LBS | HEIGHT: 73 IN | DIASTOLIC BLOOD PRESSURE: 84 MMHG | BODY MASS INDEX: 30.56 KG/M2 | SYSTOLIC BLOOD PRESSURE: 142 MMHG

## 2019-06-04 DIAGNOSIS — I10 ESSENTIAL HYPERTENSION: ICD-10-CM

## 2019-06-04 DIAGNOSIS — I44.7 LBBB (LEFT BUNDLE BRANCH BLOCK): ICD-10-CM

## 2019-06-04 DIAGNOSIS — I48.19 PERSISTENT ATRIAL FIBRILLATION (HCC): Primary | ICD-10-CM

## 2019-06-04 PROCEDURE — 99213 OFFICE O/P EST LOW 20 MIN: CPT | Performed by: INTERNAL MEDICINE

## 2019-06-04 PROCEDURE — 93000 ELECTROCARDIOGRAM COMPLETE: CPT | Performed by: INTERNAL MEDICINE

## 2019-06-04 RX ORDER — METOPROLOL SUCCINATE 25 MG/1
50 TABLET, EXTENDED RELEASE ORAL DAILY
Qty: 270 TABLET | Refills: 3 | Status: SHIPPED | OUTPATIENT
Start: 2019-06-04 | End: 2019-06-13

## 2019-06-04 RX ORDER — LOSARTAN POTASSIUM 100 MG/1
100 TABLET ORAL DAILY
Qty: 90 TABLET | Refills: 3 | Status: SHIPPED | OUTPATIENT
Start: 2019-06-04 | End: 2020-03-23

## 2019-06-04 RX ORDER — EPLERENONE 50 MG/1
50 TABLET, FILM COATED ORAL DAILY
Qty: 90 TABLET | Refills: 3 | Status: SHIPPED | OUTPATIENT
Start: 2019-06-04 | End: 2020-05-22 | Stop reason: SDUPTHER

## 2019-06-04 RX ORDER — AMLODIPINE BESYLATE 5 MG/1
5 TABLET ORAL DAILY
Qty: 90 TABLET | Refills: 3 | Status: SHIPPED | OUTPATIENT
Start: 2019-06-04 | End: 2020-06-23 | Stop reason: SDUPTHER

## 2019-06-13 ENCOUNTER — TELEPHONE (OUTPATIENT)
Dept: CARDIOLOGY | Facility: CLINIC | Age: 64
End: 2019-06-13

## 2019-06-13 RX ORDER — METOPROLOL SUCCINATE 25 MG/1
25 TABLET, EXTENDED RELEASE ORAL DAILY
Qty: 90 TABLET | Refills: 3 | Status: SHIPPED | OUTPATIENT
Start: 2019-06-13 | End: 2020-05-28 | Stop reason: SDUPTHER

## 2019-06-13 NOTE — TELEPHONE ENCOUNTER
06/13/19  2:54 PM    I called the patient to clarify his metoprolol succinate dosing.  Dr. Reynolds's note states that he is only taking 25 mg daily where the instructions from pharmacy are quite different.  Patient clarified and he is only taking 25 mg daily.  I have sent an updated prescription to [facility].    I did return her call and clarify the updated prescription.      JUAN CARLOS Gregory  [facility]

## 2019-06-13 NOTE — TELEPHONE ENCOUNTER
[facility] calling to verify Metoprolol succ directions.   States they left a message 2 days ago  Prescription was sent  In for metorpolol succ 25mg 1 po bid  States prior prescription was 2 tablets in the am and 1 in the pm  Pharm can be reached at [phone]  Ref. Number-[id number]

## 2019-06-23 NOTE — PROGRESS NOTES
Date of Office Visit: 2019  Encounter Provider: Catrachito Reynolds MD  Place of Service: [facility] CARDIOLOGY  Patient Name: Anjum Palma Jr  :1955    Chief complaint: Follow-up for persistent atrial fibrillation, hypertension, and left   bundle branch block.    History of Present Illness:    Dear Vishnu:      I again had the pleasure of seeing your patient in cardiology office on 2019. As you   well know, he is a very pleasant 64 year-old white male with a past medical history  significant for pesistent atrial fibrillation and hypertension who presents for follow-up.  The patient was found to have an abnormal EKG during a routine physical on 2012.    He subsequently presented for an outpatient Cardiolite stress test on 2012. At that   time, his heart rate was irregular and he was found to be in atrial fibrillation by EKG.  He   was completely unaware that he was in atrial fibrillation, and was essentially   asymptomatic. He did complete his stress test at that time, and the scintigraphic portion   showed no evidence of ischemia or infarction. He was initiated on Xarelto, and saw me   in the office on 2012 after his stress test. He was also placed on Toprol XL, and   subsequently underwent a transesophageal echocardiogram on 2012. At the time   of his VIOLET, he was noted to be in normal sinus rhythm. His transesophageal   echocardiogram showed mild septal hypokinesis and an ejection fraction of 55-60%. He   did not have any significant valvular anomalies and his left atrial size was normal.      The patient presented back in atrial fibrillation at his office visit on 2013. At that time,   I advised him to undergo another transesophageal echocardiogram. He underwent this   examination on 2013 and it was negative for any thrombus formation. He was  cardioverted with a single 150 joules shock on a biphasic defibrillator. He was placed  on  amiodarone for a short time afterwards and was transitioned to Multaq. He again   reverted back to atrial fibrillation afterwards.      The patient presents today for follow-up.  Overall, he is doing very well.  His only issue   continues to be his blood pressure.  He states that it is elevated about two thirds of the   time.  He is working out 60 minutes at a time on an elliptical machine without difficulty.    He has actually lost about 10 to 15 pounds since his last visit.  He remains in rate   controlled atrial fibrillation.  He has not had any bleeding on the Xarelto.  He denied any   chest pain or shortness of breath.      Past Medical History:   Diagnosis Date   • Degeneration of cervical intervertebral disc    • Hypertension    • LBBB (left bundle branch block)     Diagnosed 3/14/17   • Nephrolithiasis     Recurrent, s/p multiple lithotripsy procedures   • CHARLY on CPAP    • Osteoarthritis    • Persistent atrial fibrillation (CMS/HCC)    • Prostate cancer (CMS/HCC)     Early stage prostate cancer diagnosed late 2014.  Completed 43 radiation treatments on 5/18/15.   • Seasonal allergic reaction        Past Surgical History:   Procedure Laterality Date   • OTHER SURGICAL HISTORY      Multiple Lithotripsy procedures       Current Outpatient Medications on File Prior to Visit   Medication Sig Dispense Refill   • acetaminophen (TYLENOL) 650 MG 8 hr tablet Take 1,300 mg by mouth 2 (Two) Times a Day.     • azelastine (ASTELIN) 0.1 % nasal spray 2 sprays into each nostril 2 (Two) Times a Day. Use in each nostril as directed     • cyclobenzaprine (FLEXERIL) 10 MG tablet Take 1 tablet by mouth daily as needed.     • flunisolide (NASALIDE) 25 MCG/ACT (0.025%) solution nasal spray into each nostril Daily.     • hydrochlorothiazide (HYDRODIURIL) 25 MG tablet Take 1 tablet by mouth daily.     • levocetirizine (XYZAL) 5 MG tablet Take 5 mg by mouth Every Evening.     • montelukast (SINGULAIR) 10 MG tablet Take 1 tablet  "by mouth daily.     • Multiple Vitamin (ONE-A-DAY MENS PO) Take 1 tablet by mouth Daily.     • potassium citrate (UROCIT-K) 10 MEQ (1080 MG) CR tablet Take 10 mEq by mouth 2 (Two) Times a Day With Meals.     • rivaroxaban (XARELTO) 20 MG tablet Take 1 tablet by mouth Daily With Dinner. 90 tablet 3   • sildenafil (REVATIO) 20 MG tablet Take 20 mg by mouth.     • tamsulosin (FLOMAX) 0.4 MG capsule 24 hr capsule Take 0.4 mg by mouth.       No current facility-administered medications on file prior to visit.      Allergies as of 06/04/2019   • (No Known Allergies)     Social History     Socioeconomic History   • Marital status:      Spouse name: Not on file   • Number of children: Not on file   • Years of education: Not on file   • Highest education level: Not on file   Tobacco Use   • Smoking status: Never Smoker   • Smokeless tobacco: Never Used   Substance and Sexual Activity   • Alcohol use: Yes     Comment: Cut back on use significantly   • Drug use: No     Family History   Problem Relation Age of Onset   • Atrial fibrillation Mother          also s/p ICD placement   • Coronary artery disease Father         Father with CABG in his 50's, and had subsequent coronary stent placement.   • Uterine cancer Sister    • Heart failure Paternal Uncle        Review of Systems   Constitution: Positive for weight loss.   All other systems reviewed and are negative.     Objective:     Vitals:    06/04/19 1135   BP: 142/84   Pulse: 82   Weight: 105 kg (230 lb 9.6 oz)   Height: 185.4 cm (72.99\")     Body mass index is 30.43 kg/m².    Physical Exam   Constitutional: He is oriented to person, place, and time. He appears well-developed and well-nourished.   HENT:   Head: Normocephalic and atraumatic.   Eyes: Conjunctivae are normal.   Neck: Neck supple.   Cardiovascular: Normal rate. An irregularly irregular rhythm present. Exam reveals no gallop and no friction rub.   No murmur heard.  Pulmonary/Chest: Effort normal and breath " sounds normal.   Abdominal: Soft. There is no tenderness.   Musculoskeletal: He exhibits no edema.   Neurological: He is alert and oriented to person, place, and time.   Skin: Skin is warm.   Psychiatric: He has a normal mood and affect. His behavior is normal.     Lab Review:     ECG 12 Lead  Date/Time: 6/4/2019 11:36 AM  Performed by: Catrachito Reynolds MD  Authorized by: Catrachito Reynolds MD   Comparison: compared with previous ECG from 3/14/2017  Similar to previous ECG  Rhythm: atrial fibrillation  Rate: normal  BPM: 82  Conduction: left bundle branch block    Clinical impression: abnormal EKG          Cardiac Procedures:  1.  Echocardiogram on 3/20/2017: The left ventricle was mildly dilated.  There was mild   LVH.  Ejection fraction was 50-55%.  There was mild tricuspid regurgitation.  The left   atrium was moderately dilated.  The right atrium was moderately to severely dilated.    Assessment:       Diagnosis Plan   1. Persistent atrial fibrillation (CMS/HCC)     2. Essential hypertension     3. LBBB (left bundle branch block)       Plan:       His main issue is his blood pressure.  He is on multiple medications at this point, and this continues to be an issue over the last year or so.  Currently, he is on Toprol-XL at 25 mg/day, losartan 100 mg/day, Inspra 50 mg/day, and amlodipine 5 mg/day.  If needed in the future, one possibility would be to increase the Inspra and watch his potassium and renal function.  Based on his most recent labs in May 2019, I feel that he can likely tolerate this.  I am going to try weight loss first.  He is already lost 10 to 15 pounds, and he is working out on an elliptical machine for 60 minutes at a time.  I have set a goal of 220 pounds or less.  This is about 10 pounds from where he is at currently.  If his blood pressure is not improved at this goal, then I do feel that the Inspra will need to be increased.  I have asked him to notify me should the blood pressure  remain an issue.  He will continue on the Toprol for rate control of the atrial fibrillation.  He is taking Xarelto without any bleeding issues currently.  I will have him follow-up with me in 6 months.    Atrial Fibrillation and Atrial Flutter  Assessment  • The patient has persistent atrial fibrillation  • This is non-valvular in etiology  • The patient's CHADS2-VASc score is 1  • A [id number] score of 1 is considered an intermediate risk for a thromboembolic event  • Rivaroxaban prescribed    Plan  • Continue in atrial fibrillation with rate control  • Continue rivaroxaban for antithrombotic therapy, bleeding issues discussed  • Continue beta blocker for rate control    Subjective - Objective  • The average duration of atrial fibrillation episodes is >3 months

## 2019-11-19 ENCOUNTER — OFFICE VISIT (OUTPATIENT)
Dept: SLEEP MEDICINE | Facility: HOSPITAL | Age: 64
End: 2019-11-19
Attending: PSYCHIATRY & NEUROLOGY

## 2019-11-19 VITALS
HEIGHT: 73 IN | DIASTOLIC BLOOD PRESSURE: 83 MMHG | SYSTOLIC BLOOD PRESSURE: 136 MMHG | WEIGHT: 227 LBS | BODY MASS INDEX: 30.09 KG/M2 | OXYGEN SATURATION: 97 % | HEART RATE: 86 BPM

## 2019-11-19 DIAGNOSIS — G47.33 OSA (OBSTRUCTIVE SLEEP APNEA): Primary | ICD-10-CM

## 2019-11-19 PROCEDURE — G0463 HOSPITAL OUTPT CLINIC VISIT: HCPCS

## 2019-11-19 NOTE — PROGRESS NOTES
"Sleep clinic follow-up visit    Anjum Palma Jr  :1955   [id number]    DATE OF SERVICE: 2019     HISTORY: The patient is a 64 y.o. white male with moderately severe obstructive sleep apnea syndrome.     Polysomnography in the past has revealed apnea-hypopnea index of AHI 24.2 per sleep hour with minimum SpO2 of 70%. He is on auto CPAP therapy. During supine position he has very severe obstructive sleep apnea syndrome with Supine AHI of 30.3, in nonsupine position 3.5 per sleep hour, and during REM sleep AHI of 68.1 per sleep hour. He also has periodic limb movement disorder.     Now has a correct fitting mask and using auto CPAP therapy very regularly. Compliance data indicates excellent compliance with CPAP mean pressure of 8.3 cm of water with an average usage 7 hours and 28 minutes and using 100% of the time for more than 4 hours. His Savage Sleepiness Scale score is 2 with CPAP therapy. He is compliant and benefiting from it.  Residual AHI 3.5.    PMH, PSH, Medications, allergies, FH, SH are reviewed and updated in the chart.     10 Review of Systems - Negative except for cough.     PHYSICAL EXAMINATION:  Vitals:    19 0925   BP: 136/83   Pulse: 86   SpO2: 97%   Weight: 103 kg (227 lb)   Height: 185.4 cm (73\")     HEENT: Narrow oropharynx. Mallampati class III.   NECK: Supple. No bruits.   CARDIAC: Normal.   LUNGS: Clear to auscultation.   EXTREMITIES: No edema.     IMPRESSION: Patient with severe obstructive sleep apnea syndrome successfully treated with auto CPAP therapy and is compliant and benefiting from it.     RECOMMENDATIONS: Continue present auto CPAP. Followup in 1 year.     Cristi Dobbs M.D.  2019   "

## 2020-03-13 ENCOUNTER — OFFICE VISIT (OUTPATIENT)
Dept: CARDIOLOGY | Facility: CLINIC | Age: 65
End: 2020-03-13

## 2020-03-13 VITALS
WEIGHT: 230 LBS | HEART RATE: 75 BPM | BODY MASS INDEX: 31.15 KG/M2 | DIASTOLIC BLOOD PRESSURE: 94 MMHG | HEIGHT: 72 IN | SYSTOLIC BLOOD PRESSURE: 140 MMHG

## 2020-03-13 DIAGNOSIS — I48.19 ATRIAL FIBRILLATION, PERSISTENT (HCC): Primary | ICD-10-CM

## 2020-03-13 DIAGNOSIS — I10 HYPERTENSION, UNSPECIFIED TYPE: ICD-10-CM

## 2020-03-13 DIAGNOSIS — I44.7 LBBB (LEFT BUNDLE BRANCH BLOCK): ICD-10-CM

## 2020-03-13 PROCEDURE — 99214 OFFICE O/P EST MOD 30 MIN: CPT | Performed by: INTERNAL MEDICINE

## 2020-03-13 PROCEDURE — 93000 ELECTROCARDIOGRAM COMPLETE: CPT | Performed by: INTERNAL MEDICINE

## 2020-03-23 RX ORDER — LOSARTAN POTASSIUM 100 MG/1
100 TABLET ORAL DAILY
Qty: 90 TABLET | Refills: 3 | Status: SHIPPED | OUTPATIENT
Start: 2020-03-23 | End: 2020-06-23 | Stop reason: SDUPTHER

## 2020-03-25 NOTE — PROGRESS NOTES
Date of Office Visit:  3/13/2020  Encounter Provider: Catrachito Reynolds MD  Place of Service: [facility] CARDIOLOGY  Patient Name: Anjum Palma Jr  :1955    Chief complaint: Follow-up for persistent atrial fibrillation, hypertension, and left   bundle branch block.    History of Present Illness:    Dear Vishnu:      I again had the pleasure of seeing your patient in cardiology office on 3/13/2020.  As you   well know, he is a very pleasant 64 year-old white male with a past medical history  significant for pesistent atrial fibrillation and hypertension who presents for follow-up.  The patient was found to have an abnormal EKG during a routine physical on 2012.    He subsequently presented for an outpatient Cardiolite stress test on 2012. At that   time, his heart rate was irregular and he was found to be in atrial fibrillation by EKG.  He   was completely unaware that he was in atrial fibrillation, and was essentially   asymptomatic. He did complete his stress test at that time, and the scintigraphic portion   showed no evidence of ischemia or infarction. He was initiated on Xarelto, and saw me   in the office on 2012 after his stress test. He was also placed on Toprol XL, and   subsequently underwent a transesophageal echocardiogram on 2012. At the time   of his VIOLET, he was noted to be in normal sinus rhythm. His transesophageal   echocardiogram showed mild septal hypokinesis and an ejection fraction of 55-60%. He   did not have any significant valvular anomalies and his left atrial size was normal.      The patient presented back in atrial fibrillation at his office visit on 2013. At that time,   I advised him to undergo another transesophageal echocardiogram. He underwent this   examination on 2013 and it was negative for any thrombus formation. He was  cardioverted with a single 150 joules shock on a biphasic defibrillator. He was placed  on  amiodarone for a short time afterwards and was transitioned to Multaq. He again   reverted back to atrial fibrillation afterwards.      The patient presents today for follow-up.  An issue continues to be his blood pressure.    Today, is 140/94 manually.  He still goes to the gym and works out without any difficulty.    He is sleeping with his CPAP.  His metoprolol was actually recently increased to twice   a day.  He remains in rate controlled atrial fibrillation without symptoms.      Past Medical History:   Diagnosis Date   • Degeneration of cervical intervertebral disc    • Hypertension    • LBBB (left bundle branch block)     Diagnosed 3/14/17   • Nephrolithiasis     Recurrent, s/p multiple lithotripsy procedures   • CHARLY on CPAP    • Osteoarthritis    • Persistent atrial fibrillation    • Prostate cancer (CMS/HCC)     Early stage prostate cancer diagnosed late 2014.  Completed 43 radiation treatments on 5/18/15.   • Seasonal allergic reaction        Past Surgical History:   Procedure Laterality Date   • OTHER SURGICAL HISTORY      Multiple Lithotripsy procedures       Current Outpatient Medications on File Prior to Visit   Medication Sig Dispense Refill   • acetaminophen (TYLENOL) 650 MG 8 hr tablet Take 1,300 mg by mouth 2 (Two) Times a Day.     • amLODIPine (NORVASC) 5 MG tablet Take 1 tablet by mouth Daily. 90 tablet 3   • azelastine (ASTELIN) 0.1 % nasal spray 2 sprays into each nostril 2 (Two) Times a Day. Use in each nostril as directed     • cyclobenzaprine (FLEXERIL) 10 MG tablet Take 1 tablet by mouth daily as needed.     • eplerenone (INSPRA) 50 MG tablet Take 1 tablet by mouth Daily. 90 tablet 3   • flunisolide (NASALIDE) 25 MCG/ACT (0.025%) solution nasal spray into each nostril Daily.     • hydrochlorothiazide (HYDRODIURIL) 25 MG tablet Take 1 tablet by mouth daily.     • levocetirizine (XYZAL) 5 MG tablet Take 5 mg by mouth Every Evening.     • metoprolol succinate XL (TOPROL-XL) 25 MG 24 hr  "tablet Take 1 tablet by mouth Daily. (Patient taking differently: Take 25 mg by mouth 2 (Two) Times a Day.) 90 tablet 3   • montelukast (SINGULAIR) 10 MG tablet Take 1 tablet by mouth daily.     • Multiple Vitamin (ONE-A-DAY MENS PO) Take 1 tablet by mouth Daily.     • potassium citrate (UROCIT-K) 10 MEQ (1080 MG) CR tablet Take 10 mEq by mouth 2 (Two) Times a Day With Meals.     • rivaroxaban (XARELTO) 20 MG tablet Take 1 tablet by mouth Daily With Dinner. 90 tablet 3   • sildenafil (REVATIO) 20 MG tablet Take 20 mg by mouth.     • tamsulosin (FLOMAX) 0.4 MG capsule 24 hr capsule Take 0.4 mg by mouth.       No current facility-administered medications on file prior to visit.      Allergies as of 03/13/2020   • (No Known Allergies)     Social History     Socioeconomic History   • Marital status:      Spouse name: Not on file   • Number of children: Not on file   • Years of education: Not on file   • Highest education level: Not on file   Tobacco Use   • Smoking status: Never Smoker   • Smokeless tobacco: Never Used   Substance and Sexual Activity   • Alcohol use: Yes     Comment: Cut back on use significantly   • Drug use: No     Family History   Problem Relation Age of Onset   • Atrial fibrillation Mother          also s/p ICD placement   • Coronary artery disease Father         Father with CABG in his 50's, and had subsequent coronary stent placement.   • Uterine cancer Sister    • Heart failure Paternal Uncle        Review of Systems   Constitution: Positive for weight loss.   HENT: Positive for sore throat.    Genitourinary: Positive for frequency.   All other systems reviewed and are negative.     Objective:     Vitals:    03/13/20 1338   BP: 140/94   BP Location: Left arm   Pulse: 75   Weight: 104 kg (230 lb)   Height: 182.9 cm (72\")     Body mass index is 31.19 kg/m².    Physical Exam   Constitutional: He is oriented to person, place, and time. He appears well-developed and well-nourished.   HENT: "   Head: Normocephalic and atraumatic.   Eyes: Conjunctivae are normal.   Neck: Neck supple.   Cardiovascular: Normal rate. An irregularly irregular rhythm present. Exam reveals no gallop and no friction rub.   No murmur heard.  Pulmonary/Chest: Effort normal and breath sounds normal.   Abdominal: Soft. There is no tenderness.   Musculoskeletal: He exhibits no edema.   Neurological: He is alert and oriented to person, place, and time.   Skin: Skin is warm.   Psychiatric: He has a normal mood and affect. His behavior is normal.     Lab Review:     ECG 12 Lead  Date/Time: 3/13/2020 12:28 PM  Performed by: Catrachito Reynolds MD  Authorized by: Catrachito Reynolds MD   Comparison: compared with previous ECG from 6/4/2019  Similar to previous ECG  Rhythm: atrial fibrillation  Rate: normal  BPM: 75  Conduction: left bundle branch block    Clinical impression: abnormal EKG          Cardiac Procedures:  1.  Echocardiogram on 3/20/2017: The left ventricle was mildly dilated.  There was mild   LVH.  Ejection fraction was 50-55%.  There was mild tricuspid regurgitation.  The left   atrium was moderately dilated.  The right atrium was moderately to severely dilated.    Assessment:       Diagnosis Plan   1. Atrial fibrillation, persistent  Adult Transthoracic Echo Complete W/ Cont if Necessary Per Protocol   2. Hypertension, unspecified type  CT angiogram renal   3. LBBB (left bundle branch block)       Plan:       Again, his blood pressure has been very difficult to control.  He is currently on multiple medications.  These include amlodipine 5 mg/day, eplerenone 50 mg/day, HCTZ 25 mg/day, Cozaar 100 mg/day, and Toprol-XL 25 mg twice a day.  I could go up on the eplerenone, although I would need to be cautious with this given his last potassium was 4.8.  I am going to check a CT angiogram of his renal arteries at this point to assess for any potential renal artery stenosis.  I will wait on the results of this to adjust his  medications further.  His atrial fibrillation is well controlled, and he remains asymptomatic from this.  He will continue the Xarelto.  He has had no bleeding problems.  He is wearing his CPAP mask faithfully.  He has not had any chest pain at the gym.  Further plans will be made pending the results of the CT angiogram of the renal arteries.

## 2020-04-27 ENCOUNTER — HOSPITAL ENCOUNTER (OUTPATIENT)
Dept: CT IMAGING | Facility: HOSPITAL | Age: 65
Discharge: HOME | End: 2020-04-27
Admitting: INTERNAL MEDICINE

## 2020-04-27 ENCOUNTER — HOSPITAL ENCOUNTER (OUTPATIENT)
Dept: CARDIOLOGY | Facility: HOSPITAL | Age: 65
Discharge: HOME | End: 2020-04-27

## 2020-04-27 VITALS
WEIGHT: 230 LBS | SYSTOLIC BLOOD PRESSURE: 126 MMHG | HEIGHT: 72 IN | HEART RATE: 68 BPM | BODY MASS INDEX: 31.15 KG/M2 | DIASTOLIC BLOOD PRESSURE: 85 MMHG

## 2020-04-27 DIAGNOSIS — I48.19 ATRIAL FIBRILLATION, PERSISTENT (HCC): ICD-10-CM

## 2020-04-27 DIAGNOSIS — I10 HYPERTENSION, UNSPECIFIED TYPE: ICD-10-CM

## 2020-04-27 LAB
AORTIC DIMENSIONLESS INDEX: 0.5 (DI)
BH CV ECHO MEAS - ACS: 2.4 CM
BH CV ECHO MEAS - AO MAX PG (FULL): 7.5 MMHG
BH CV ECHO MEAS - AO MAX PG: 10.4 MMHG
BH CV ECHO MEAS - AO MEAN PG (FULL): 3 MMHG
BH CV ECHO MEAS - AO MEAN PG: 5 MMHG
BH CV ECHO MEAS - AO ROOT AREA (BSA CORRECTED): 2
BH CV ECHO MEAS - AO ROOT AREA: 16.6 CM^2
BH CV ECHO MEAS - AO ROOT DIAM: 4.6 CM
BH CV ECHO MEAS - AO V2 MAX: 161 CM/SEC
BH CV ECHO MEAS - AO V2 MEAN: 106 CM/SEC
BH CV ECHO MEAS - AO V2 VTI: 28.3 CM
BH CV ECHO MEAS - AVA(I,A): 2.3 CM^2
BH CV ECHO MEAS - AVA(I,D): 2.3 CM^2
BH CV ECHO MEAS - AVA(V,A): 2.4 CM^2
BH CV ECHO MEAS - AVA(V,D): 2.4 CM^2
BH CV ECHO MEAS - BSA(HAYCOCK): 2.3 M^2
BH CV ECHO MEAS - BSA: 2.3 M^2
BH CV ECHO MEAS - BZI_BMI: 31.2 KILOGRAMS/M^2
BH CV ECHO MEAS - BZI_METRIC_HEIGHT: 182.9 CM
BH CV ECHO MEAS - BZI_METRIC_WEIGHT: 104.3 KG
BH CV ECHO MEAS - EDV(CUBED): 216 ML
BH CV ECHO MEAS - EDV(MOD-SP2): 89 ML
BH CV ECHO MEAS - EDV(MOD-SP4): 72 ML
BH CV ECHO MEAS - EDV(TEICH): 180 ML
BH CV ECHO MEAS - EF(CUBED): 72.5 %
BH CV ECHO MEAS - EF(MOD-SP2): 56.2 %
BH CV ECHO MEAS - EF(MOD-SP4): 51.4 %
BH CV ECHO MEAS - EF(TEICH): 63.4 %
BH CV ECHO MEAS - ESV(CUBED): 59.3 ML
BH CV ECHO MEAS - ESV(MOD-SP2): 39 ML
BH CV ECHO MEAS - ESV(MOD-SP4): 35 ML
BH CV ECHO MEAS - ESV(TEICH): 65.9 ML
BH CV ECHO MEAS - FS: 35 %
BH CV ECHO MEAS - IVS/LVPW: 0.91
BH CV ECHO MEAS - IVSD: 1 CM
BH CV ECHO MEAS - LAT PEAK E' VEL: 6.2 CM/SEC
BH CV ECHO MEAS - LV DIASTOLIC VOL/BSA (35-75): 31.8 ML/M^2
BH CV ECHO MEAS - LV MASS(C)D: 263 GRAMS
BH CV ECHO MEAS - LV MASS(C)DI: 116.4 GRAMS/M^2
BH CV ECHO MEAS - LV MAX PG: 2.8 MMHG
BH CV ECHO MEAS - LV MEAN PG: 2 MMHG
BH CV ECHO MEAS - LV SYSTOLIC VOL/BSA (12-30): 15.5 ML/M^2
BH CV ECHO MEAS - LV V1 MAX: 84.2 CM/SEC
BH CV ECHO MEAS - LV V1 MEAN: 57.1 CM/SEC
BH CV ECHO MEAS - LV V1 VTI: 14.6 CM
BH CV ECHO MEAS - LVIDD: 6 CM
BH CV ECHO MEAS - LVIDS: 3.9 CM
BH CV ECHO MEAS - LVLD AP2: 7.2 CM
BH CV ECHO MEAS - LVLD AP4: 6.8 CM
BH CV ECHO MEAS - LVLS AP2: 6.1 CM
BH CV ECHO MEAS - LVLS AP4: 6 CM
BH CV ECHO MEAS - LVOT AREA (M): 4.5 CM^2
BH CV ECHO MEAS - LVOT AREA: 4.5 CM^2
BH CV ECHO MEAS - LVOT DIAM: 2.4 CM
BH CV ECHO MEAS - LVPWD: 1.1 CM
BH CV ECHO MEAS - MED PEAK E' VEL: 4.95 CM/SEC
BH CV ECHO MEAS - MV DEC SLOPE: 411 CM/SEC^2
BH CV ECHO MEAS - MV DEC TIME: 285 SEC
BH CV ECHO MEAS - MV E MAX VEL: 92.2 CM/SEC
BH CV ECHO MEAS - MV MAX PG: 3.9 MMHG
BH CV ECHO MEAS - MV MEAN PG: 1 MMHG
BH CV ECHO MEAS - MV P1/2T MAX VEL: 110 CM/SEC
BH CV ECHO MEAS - MV P1/2T: 78.4 MSEC
BH CV ECHO MEAS - MV V2 MAX: 99 CM/SEC
BH CV ECHO MEAS - MV V2 MEAN: 54.1 CM/SEC
BH CV ECHO MEAS - MV V2 VTI: 27.2 CM
BH CV ECHO MEAS - MVA P1/2T LCG: 2 CM^2
BH CV ECHO MEAS - MVA(P1/2T): 2.8 CM^2
BH CV ECHO MEAS - MVA(VTI): 2.4 CM^2
BH CV ECHO MEAS - PA ACC TIME: 0.05 SEC
BH CV ECHO MEAS - PA MAX PG (FULL): 0.52 MMHG
BH CV ECHO MEAS - PA MAX PG: 3.4 MMHG
BH CV ECHO MEAS - PA PR(ACCEL): 55.2 MMHG
BH CV ECHO MEAS - PA V2 MAX: 92.3 CM/SEC
BH CV ECHO MEAS - PULM DIAS VEL: 43.2 CM/SEC
BH CV ECHO MEAS - PULM S/D: 0.57
BH CV ECHO MEAS - PULM SYS VEL: 24.8 CM/SEC
BH CV ECHO MEAS - RAP SYSTOLE: 3 MMHG
BH CV ECHO MEAS - RV MAX PG: 2.9 MMHG
BH CV ECHO MEAS - RV MEAN PG: 1 MMHG
BH CV ECHO MEAS - RV V1 MAX: 85 CM/SEC
BH CV ECHO MEAS - RV V1 MEAN: 55.7 CM/SEC
BH CV ECHO MEAS - RV V1 VTI: 14.7 CM
BH CV ECHO MEAS - RVSP: 30 MMHG
BH CV ECHO MEAS - SI(AO): 208 ML/M^2
BH CV ECHO MEAS - SI(CUBED): 69.3 ML/M^2
BH CV ECHO MEAS - SI(LVOT): 29.2 ML/M^2
BH CV ECHO MEAS - SI(MOD-SP2): 22.1 ML/M^2
BH CV ECHO MEAS - SI(MOD-SP4): 16.4 ML/M^2
BH CV ECHO MEAS - SI(TEICH): 50.5 ML/M^2
BH CV ECHO MEAS - SV(AO): 470.3 ML
BH CV ECHO MEAS - SV(CUBED): 156.7 ML
BH CV ECHO MEAS - SV(LVOT): 66 ML
BH CV ECHO MEAS - SV(MOD-SP2): 50 ML
BH CV ECHO MEAS - SV(MOD-SP4): 37 ML
BH CV ECHO MEAS - SV(TEICH): 114.1 ML
BH CV ECHO MEAS - TAPSE (>1.6): 2.1 CM
BH CV ECHO MEAS - TR MAX PG: 27 MMHG
BH CV ECHO MEAS - TR MAX VEL: 259 CM/SEC
BH CV ECHO MEASUREMENTS AVERAGE E/E' RATIO: 16.54
BH CV XLRA - RV BASE: 2.8 CM
BH CV XLRA - TDI S': 13.1 CM/SEC
CREAT BLDA-MCNC: 1.3 MG/DL (ref 0.6–1.3)
LEFT ATRIUM VOLUME INDEX: 27.2 ML/M2
MAXIMAL PREDICTED HEART RATE: 156 BPM
STRESS TARGET HR: 133 BPM

## 2020-04-27 PROCEDURE — 74175 CTA ABDOMEN W/CONTRAST: CPT

## 2020-04-27 PROCEDURE — 93306 TTE W/DOPPLER COMPLETE: CPT

## 2020-04-27 PROCEDURE — 93306 TTE W/DOPPLER COMPLETE: CPT | Performed by: INTERNAL MEDICINE

## 2020-04-27 PROCEDURE — 82565 ASSAY OF CREATININE: CPT

## 2020-04-27 PROCEDURE — 0 IOPAMIDOL PER 1 ML: Performed by: INTERNAL MEDICINE

## 2020-04-27 RX ADMIN — IOPAMIDOL 99 ML: 755 INJECTION, SOLUTION INTRAVENOUS at 08:57

## 2020-04-29 DIAGNOSIS — I10 ESSENTIAL HYPERTENSION: Primary | ICD-10-CM

## 2020-04-29 NOTE — PROGRESS NOTES
I called and spoke with him.  The echocardiogram looks good.  He did not have renal artery stenosis on his CT scan.  At this point, I feel that he just has severe hypertension which is difficult to control.  He is currently on amlodipine 5 mg/day, HCTZ 25 mg/day, losartan 100 mg/day, Inspra 50 mg/day, and Toprol-XL 50 mg/day.  His blood pressure is still mainly in the 140s over 80s, and occasionally higher.  I am going to cautiously increase his Inspra to 50 mg in the morning and 25 mg in the evening.  His last potassium was 4.8, and this will need to be watched very closely.  I have ordered a basic metabolic panel, and he is going to have this drawn in 1 week.  Further plans will be made at that time.  I may try to increase the amlodipine as well to see if he can tolerate this without edema (in the future).    ЕЛЕНА

## 2020-05-06 ENCOUNTER — LAB (OUTPATIENT)
Dept: LAB | Facility: HOSPITAL | Age: 65
End: 2020-05-06

## 2020-05-06 DIAGNOSIS — I10 ESSENTIAL HYPERTENSION: ICD-10-CM

## 2020-05-06 LAB
ANION GAP SERPL CALCULATED.3IONS-SCNC: 13.1 MMOL/L (ref 5–15)
BUN BLD-MCNC: 24 MG/DL (ref 8–23)
BUN/CREAT SERPL: 19.2 (ref 7–25)
CALCIUM SPEC-SCNC: 9.7 MG/DL (ref 8.6–10.5)
CHLORIDE SERPL-SCNC: 99 MMOL/L (ref 98–107)
CO2 SERPL-SCNC: 26.9 MMOL/L (ref 22–29)
CREAT BLD-MCNC: 1.25 MG/DL (ref 0.76–1.27)
GFR SERPL CREATININE-BSD FRML MDRD: 58 ML/MIN/1.73
GLUCOSE BLD-MCNC: 96 MG/DL (ref 65–99)
POTASSIUM BLD-SCNC: 4.7 MMOL/L (ref 3.5–5.2)
SODIUM BLD-SCNC: 139 MMOL/L (ref 136–145)

## 2020-05-06 PROCEDURE — 80048 BASIC METABOLIC PNL TOTAL CA: CPT

## 2020-05-06 PROCEDURE — 36415 COLL VENOUS BLD VENIPUNCTURE: CPT

## 2020-05-07 ENCOUNTER — TELEPHONE (OUTPATIENT)
Dept: CARDIOLOGY | Facility: CLINIC | Age: 65
End: 2020-05-07

## 2020-05-07 NOTE — TELEPHONE ENCOUNTER
Patient verbalized understanding  He will continue his BP log and send it to you next week.    Gunjan Moore RN  Triage [facility]

## 2020-05-07 NOTE — TELEPHONE ENCOUNTER
I would like for him to start taking 10 mg of amlodipine.  Please let him know that he needs to watch for edema, as it is common with this dose.  Please ask him to record his blood pressures again as he did in the diary he sent me.  Please ask him to send it through my chart or to call me in a about a week.  I was encouraged to see that his blood pressure was at least trending down with the increasing dose of eplerenone.  He does not need follow-up lab work for an increase in amlodipine.  Can you please let him know?  Thanks    GM

## 2020-05-07 NOTE — TELEPHONE ENCOUNTER
Dr. Reynolds,   I updated Mr. Palma on his labwork results.    He states that his BP is still running high and that he left a [place] message on 5/6/2020 with his BP log.    I have copied that message here:    Dr Dsouza; I have been taken the additional 1/2 of a Eplerenone 50mg since April 29th. That makee one week of treatment with 1-1/2 of the Eplerenone, 2 of the Metoprolol 25mg, Losartan 100mg, one of the Amlodipine 5mg and one of the 25mg Hydrochloro 25mg. I got blood work today as directed at [facility]. Recorded BP and HR so you can review before we discuss further med adjustments     My BP and HR since treatment change has been.   4/30  159/102 and 65   5/1 149/106 and 73   5/3 136/99 and 80   5/4 139/92 and 69   5/5 142/92 and 83   5/6 133/90 and 73        Gunjan Moore RN  Triage [facility]

## 2020-05-22 RX ORDER — EPLERENONE 50 MG/1
TABLET, FILM COATED ORAL
Qty: 270 TABLET | Refills: 3 | Status: SHIPPED | OUTPATIENT
Start: 2020-05-22 | End: 2021-04-12 | Stop reason: SDUPTHER

## 2020-05-28 RX ORDER — METOPROLOL SUCCINATE 25 MG/1
25 TABLET, EXTENDED RELEASE ORAL DAILY
Qty: 90 TABLET | Refills: 3 | Status: SHIPPED | OUTPATIENT
Start: 2020-05-28 | End: 2020-06-23 | Stop reason: SDUPTHER

## 2020-06-23 RX ORDER — AMLODIPINE BESYLATE 5 MG/1
5 TABLET ORAL 2 TIMES DAILY
Qty: 180 TABLET | Refills: 3 | Status: SHIPPED | OUTPATIENT
Start: 2020-06-23 | End: 2021-04-12 | Stop reason: SDUPTHER

## 2020-06-23 RX ORDER — LOSARTAN POTASSIUM 100 MG/1
100 TABLET ORAL DAILY
Qty: 90 TABLET | Refills: 3 | Status: SHIPPED | OUTPATIENT
Start: 2020-06-23 | End: 2021-04-12

## 2020-06-23 RX ORDER — RIVAROXABAN 20 MG/1
TABLET, FILM COATED ORAL
Qty: 90 TABLET | Refills: 3 | Status: SHIPPED | OUTPATIENT
Start: 2020-06-23 | End: 2020-07-31 | Stop reason: SDUPTHER

## 2020-06-23 RX ORDER — AMLODIPINE BESYLATE 5 MG/1
5 TABLET ORAL 2 TIMES DAILY
Qty: 90 TABLET | Refills: 3 | Status: SHIPPED | OUTPATIENT
Start: 2020-06-23 | End: 2020-06-23 | Stop reason: SDUPTHER

## 2020-06-23 RX ORDER — METOPROLOL SUCCINATE 25 MG/1
25 TABLET, EXTENDED RELEASE ORAL DAILY
Qty: 90 TABLET | Refills: 3 | Status: SHIPPED | OUTPATIENT
Start: 2020-06-23 | End: 2020-07-13 | Stop reason: SDUPTHER

## 2020-07-13 RX ORDER — METOPROLOL SUCCINATE 50 MG/1
50 TABLET, EXTENDED RELEASE ORAL DAILY
Qty: 90 TABLET | Refills: 3 | Status: SHIPPED | OUTPATIENT
Start: 2020-07-13 | End: 2021-06-23

## 2020-09-10 ENCOUNTER — TELEMEDICINE (OUTPATIENT)
Dept: CARDIOLOGY | Facility: CLINIC | Age: 65
End: 2020-09-10

## 2020-09-10 VITALS
SYSTOLIC BLOOD PRESSURE: 124 MMHG | WEIGHT: 238 LBS | HEIGHT: 73 IN | BODY MASS INDEX: 31.54 KG/M2 | DIASTOLIC BLOOD PRESSURE: 82 MMHG

## 2020-09-10 DIAGNOSIS — I10 ESSENTIAL HYPERTENSION: ICD-10-CM

## 2020-09-10 DIAGNOSIS — I48.19 ATRIAL FIBRILLATION, PERSISTENT (HCC): Primary | ICD-10-CM

## 2020-09-10 DIAGNOSIS — I44.7 LBBB (LEFT BUNDLE BRANCH BLOCK): ICD-10-CM

## 2020-09-10 PROCEDURE — 99213 OFFICE O/P EST LOW 20 MIN: CPT | Performed by: NURSE PRACTITIONER

## 2020-09-10 NOTE — PROGRESS NOTES
"    [facility] CARDIOLOGY  [address]  [facility] [address]  Phone: [phone]      Patient Name: Anjum Palma Jr  :1955  Age: 65 y.o.  Primary Cardiologist: Layton Reynolds MD  Encounter Provider:  JUAN CARLOS Delatorre      Chief Complaint:   Chief Complaint   Patient presents with   • Atrial Fibrillation         HPI  Anjum Palma Jr is a 65 y.o. male who presents today via video visit secondary to COVID pandemic. Patient presents today for semiannual evaluation.     Pt has a  history significant for persistent atrial fibrillation, hypertension, LBBB.    Patient reports that he has done very well over the past 6 months.  He notes that since increasing his amlodipine for his blood pressure his blood pressure has been much more controlled averaging in the 120s/80s.  He states that he typically exercises on an elliptical bike 30 minutes/day.  Reports that he has not done this for the past several weeks but needs to get back into the daily habit.  He reports that overall he is feeling well.  He denies any chest pain, shortness of breath, dyspnea with exertion, palpitations, lower extremity edema, fatigue.  He states that he is tolerating all medications without any complications.  Specifically, he has not had any bleeding complications with Xarelto.      The following portions of the patient's history were reviewed and updated as appropriate: allergies, current medications, past family history, past medical history, past social history, past surgical history and problem list.      Review of Systems   Constitution: Negative for malaise/fatigue.   Cardiovascular: Negative for chest pain and leg swelling.   Respiratory: Negative for shortness of breath.    Neurological: Negative for light-headedness.   All other systems reviewed and are negative.      OBJECTIVE:     Vitals:    09/10/20 0917   BP: 124/82   Weight: 108 kg (238 lb)   Height: 185.4 cm (73\") "     Body mass index is 31.4 kg/m².    Physical Exam   Constitutional: He is oriented to person, place, and time. He appears well-developed.   HENT:   Head: Normocephalic and atraumatic.   Eyes: Conjunctivae are normal.   Pulmonary/Chest: No respiratory distress.   Neurological: He is alert and oriented to person, place, and time. GCS eye subscore is 4. GCS verbal subscore is 5. GCS motor subscore is 6.   Psychiatric: He has a normal mood and affect. His speech is normal and behavior is normal. Judgment and thought content normal. Cognition and memory are normal.         Procedures    Cardiac Procedures:  1. Echocardiogram 3/20/2017.  The LV was mildly dilated.  Mild LVH.  EF 50-55%.  Mild tricuspid valve regurgitation.  Left atrium was moderately dilated.  Right atrium moderately to severely dilated.  2. Echocardiogram 4/27/2020.  LVEF 50-55%.  LV cavity mildly dilated.  Mild LVH.  Abnormal septal wall motion from LBBB.  Abnormal LV diastolic dysfunction with elevated LA pressure in the setting of A. fib.  Trace to mild TR otherwise no significant valvular stenosis or regurgitation.  Estimated RVSP less than 35 mmHg.  Patient in atrial fibrillation.    ASSESSMENT:      Diagnosis Plan   1. Atrial fibrillation, persistent (CMS/HCC)     2. Essential hypertension     3. LBBB (left bundle branch block)           PLAN OF CARE:     1. Persistent atrial fibrillation.  She reports that heart rate has been controlled.  He is asymptomatic and denies any chest pain, shortness of breath, episodes of tachycardia.  He will continue metoprolol succinate 50 mg/day for rate control.  Continue Xarelto 20 mg/day for anticoagulation.  Patient denies any bleeding complications.  2. Hypertension.  Patient's blood pressure is much more controlled since uptitrating his antihypertensive regimen.  He will continue metoprolol succinate 50 mg/day, losartan 100 mg/day, HCTZ 25 mg/day, Inspra 50 mg/day, amlodipine 10 mg/day.  Patient has not had  any increase in lower extremity edema with increased dose of amlodipine.  3. History of LBBB.  4. No changes made to patient's regimen today.  He will follow-up with Dr. Reynolds in 6 months.  Encouraged to call the office sooner for any problems or complications.    This patient has consented to a telehealth visit via video. I spent 11 minutes in total including but not limited to the direct conversation with this patient. All vitals recorded within this visit are reported by the patient.         Discharge Medications           Accurate as of September 10, 2020  9:51 AM. If you have any questions, ask your nurse or doctor.               Continue These Medications      Instructions Start Date   acetaminophen 650 MG 8 hr tablet  Commonly known as:  TYLENOL   1,300 mg, Oral, 2 Times Daily      amLODIPine 5 MG tablet  Commonly known as:  NORVASC   5 mg, Oral, 2 Times Daily      azelastine 0.1 % nasal spray  Commonly known as:  ASTELIN   2 sprays, Nasal, 2 Times Daily, Use in each nostril as directed       cyclobenzaprine 10 MG tablet  Commonly known as:  FLEXERIL   1 tablet, Oral, Daily PRN      eplerenone 50 MG tablet  Commonly known as:  Inspra   TAKE 1 TABLET ( 50 MG) IN MORNING AND TAKE 1/2 TABLET ( 25 MG ) AT NIGHT.      flunisolide 25 MCG/ACT (0.025%) solution nasal spray  Commonly known as:  NASALIDE   Nasal, Daily      hydroCHLOROthiazide 25 MG tablet  Commonly known as:  HYDRODIURIL   1 tablet, Oral, Daily      losartan 100 MG tablet  Commonly known as:  COZAAR   100 mg, Oral, Daily      metoprolol succinate XL 50 MG 24 hr tablet  Commonly known as:  TOPROL-XL   50 mg, Oral, Daily      montelukast 10 MG tablet  Commonly known as:  SINGULAIR   1 tablet, Oral, Daily      potassium citrate 10 MEQ (1080 MG) CR tablet  Commonly known as:  UROCIT-K   10 mEq, Oral, 2 Times Daily With Meals      rivaroxaban 20 MG tablet  Commonly known as:  Xarelto   20 mg, Oral, Daily With Dinner      sildenafil 20 MG  tablet  Commonly known as:  REVATIO   20 mg, Oral, As Needed      tamsulosin 0.4 MG capsule 24 hr capsule  Commonly known as:  FLOMAX   0.4 mg, Oral, Daily      Xyzal 5 MG tablet  Generic drug:  levocetirizine   5 mg, Oral, Every Evening         Stop These Medications    ONE-A-DAY MENS PO  Stopped by:  JUAN CARLOS Delatorre            Thank you for allowing me to participate in the care of your patient,         JUAN CARLOS Delatorre  [facility]  09/10/20  9:23 AM      **Dragon Disclaimer:**  Much of this encounter note is an electronic transcription/translation of spoken language to printed text. The electronic translation of spoken language may permit erroneous, or at times, nonsensical words or phrases to be inadvertently transcribed. Although I have reviewed the note for such errors, some may still exist.

## 2020-11-17 ENCOUNTER — OFFICE VISIT (OUTPATIENT)
Dept: SLEEP MEDICINE | Facility: HOSPITAL | Age: 65
End: 2020-11-17

## 2020-11-17 VITALS
OXYGEN SATURATION: 97 % | WEIGHT: 248 LBS | HEART RATE: 71 BPM | HEIGHT: 73 IN | SYSTOLIC BLOOD PRESSURE: 117 MMHG | DIASTOLIC BLOOD PRESSURE: 76 MMHG | BODY MASS INDEX: 32.87 KG/M2

## 2020-11-17 DIAGNOSIS — G47.33 OSA (OBSTRUCTIVE SLEEP APNEA): Primary | ICD-10-CM

## 2020-11-17 PROCEDURE — G0463 HOSPITAL OUTPT CLINIC VISIT: HCPCS

## 2020-11-17 NOTE — PROGRESS NOTES
"Sleep clinic follow-up visit    Anjum Palma Jr  :1955   [id number]    DATE OF SERVICE: 2020     HISTORY: The patient is a 65 y.o. white male with moderately severe obstructive sleep apnea syndrome.     Polysomnography in the past has revealed apnea-hypopnea index of AHI 24.2 per sleep hour with minimum SpO2 of 70%. He is on auto CPAP therapy. During supine position he has very severe obstructive sleep apnea syndrome with Supine AHI of 30.3, in nonsupine position 3.5 per sleep hour, and during REM sleep AHI of 68.1 per sleep hour. He also has periodic limb movement disorder.     Now has a correct fitting mask and using auto CPAP therapy very regularly. Compliance data indicates excellent compliance with CPAP mean pressure of 8.9 cm of water with an average usage 7 hours and 51 minutes and using 100% of the time for more than 4 hours. His Bayard Sleepiness Scale score is 2 with CPAP therapy. He is compliant and benefiting from it.  Residual AHI 7.5.  The patient is compliant and benefiting from CPAP therapy.    The patient reports that the water time: For the CPAP machine does not fit in the part is broken and it needs to be repaired or replaced.    Full face mask fits good.  DME company: [employer]    PMH, PSH, Medications, allergies, FH, SH are reviewed and updated in the chart.     10 Review of Systems - Negative except for cough.     PHYSICAL EXAMINATION:  Vitals:    20 0847   BP: 117/76   Pulse: 71   SpO2: 97%   Weight: 112 kg (248 lb)   Height: 185.4 cm (73\")     HEENT: Narrow oropharynx. Mallampati class III.   NECK: Supple. No bruits.   CARDIAC: Normal.   LUNGS: Clear to auscultation.   EXTREMITIES: No edema.     IMPRESSION: Patient with severe obstructive sleep apnea syndrome successfully treated with auto CPAP therapy and is compliant and benefiting from it.  The patient reports that the water tank hinge has been damaged and needs it to be repaired or replaced with a new auto CPAP machine. "  Will request for repair or replace his current CPAP machine.    RECOMMENDATIONS: Continue present auto CPAP we will request for repair or replacement of current auto CPAP machine as the water chamber is broken. Followup in 1 year.     Cristi Dobbs M.D.  11/17/2020

## 2020-11-18 ENCOUNTER — TELEPHONE (OUTPATIENT)
Dept: SLEEP MEDICINE | Facility: HOSPITAL | Age: 65
End: 2020-11-18

## 2020-11-18 NOTE — TELEPHONE ENCOUNTER
Pt switched DME company from [employer] to [employer].  Orders sent on 11/18/2020 to repair or replace machine per Dr. Dobbs.  CV

## 2021-01-19 ENCOUNTER — OFFICE VISIT (OUTPATIENT)
Dept: SLEEP MEDICINE | Facility: HOSPITAL | Age: 66
End: 2021-01-19

## 2021-01-19 VITALS
HEIGHT: 73 IN | DIASTOLIC BLOOD PRESSURE: 61 MMHG | HEART RATE: 52 BPM | WEIGHT: 234 LBS | SYSTOLIC BLOOD PRESSURE: 84 MMHG | BODY MASS INDEX: 31.01 KG/M2 | OXYGEN SATURATION: 91 %

## 2021-01-19 DIAGNOSIS — I48.11 LONGSTANDING PERSISTENT ATRIAL FIBRILLATION (HCC): ICD-10-CM

## 2021-01-19 DIAGNOSIS — G47.33 OSA (OBSTRUCTIVE SLEEP APNEA): Primary | ICD-10-CM

## 2021-01-19 PROCEDURE — G0463 HOSPITAL OUTPT CLINIC VISIT: HCPCS

## 2021-03-19 ENCOUNTER — BULK ORDERING (OUTPATIENT)
Dept: CASE MANAGEMENT | Facility: OTHER | Age: 66
End: 2021-03-19

## 2021-03-19 DIAGNOSIS — Z23 IMMUNIZATION DUE: ICD-10-CM

## 2021-04-12 ENCOUNTER — OFFICE VISIT (OUTPATIENT)
Dept: CARDIOLOGY | Facility: CLINIC | Age: 66
End: 2021-04-12

## 2021-04-12 VITALS
BODY MASS INDEX: 28.23 KG/M2 | HEIGHT: 73 IN | HEART RATE: 66 BPM | SYSTOLIC BLOOD PRESSURE: 108 MMHG | WEIGHT: 213 LBS | DIASTOLIC BLOOD PRESSURE: 64 MMHG | OXYGEN SATURATION: 99 %

## 2021-04-12 DIAGNOSIS — I44.7 LBBB (LEFT BUNDLE BRANCH BLOCK): ICD-10-CM

## 2021-04-12 DIAGNOSIS — I10 ESSENTIAL HYPERTENSION: ICD-10-CM

## 2021-04-12 DIAGNOSIS — I48.19 ATRIAL FIBRILLATION, PERSISTENT (HCC): Primary | ICD-10-CM

## 2021-04-12 PROCEDURE — 99214 OFFICE O/P EST MOD 30 MIN: CPT | Performed by: NURSE PRACTITIONER

## 2021-04-12 RX ORDER — ERGOCALCIFEROL 1.25 MG/1
50000 CAPSULE ORAL
COMMUNITY
Start: 2020-12-01 | End: 2022-09-26

## 2021-04-12 RX ORDER — ERGOCALCIFEROL 1.25 MG/1
50000 CAPSULE ORAL
COMMUNITY
Start: 2021-02-13 | End: 2022-09-26

## 2021-04-12 RX ORDER — EPLERENONE 25 MG/1
25 TABLET, FILM COATED ORAL 2 TIMES DAILY
Qty: 180 TABLET | Refills: 3 | Status: SHIPPED | OUTPATIENT
Start: 2021-04-12 | End: 2022-07-18 | Stop reason: SDUPTHER

## 2021-04-12 RX ORDER — POTASSIUM CITRATE 15 MEQ/1
1 TABLET, EXTENDED RELEASE ORAL 3 TIMES DAILY
COMMUNITY
Start: 2021-01-18

## 2021-04-12 RX ORDER — LOSARTAN POTASSIUM 50 MG/1
50 TABLET ORAL 2 TIMES DAILY
Qty: 180 TABLET | Refills: 3 | Status: SHIPPED | OUTPATIENT
Start: 2021-04-12 | End: 2022-06-09

## 2021-04-12 RX ORDER — AMLODIPINE BESYLATE 5 MG/1
5 TABLET ORAL DAILY
Qty: 90 TABLET | Refills: 3 | Status: SHIPPED | OUTPATIENT
Start: 2021-04-12 | End: 2021-11-09

## 2021-05-12 ENCOUNTER — OFFICE VISIT (OUTPATIENT)
Dept: CARDIOLOGY | Facility: CLINIC | Age: 66
End: 2021-05-12

## 2021-05-12 VITALS
WEIGHT: 203 LBS | DIASTOLIC BLOOD PRESSURE: 82 MMHG | SYSTOLIC BLOOD PRESSURE: 106 MMHG | BODY MASS INDEX: 26.9 KG/M2 | HEIGHT: 73 IN | HEART RATE: 63 BPM | OXYGEN SATURATION: 99 %

## 2021-05-12 DIAGNOSIS — I44.7 LBBB (LEFT BUNDLE BRANCH BLOCK): ICD-10-CM

## 2021-05-12 DIAGNOSIS — I10 ESSENTIAL HYPERTENSION: ICD-10-CM

## 2021-05-12 DIAGNOSIS — I48.19 ATRIAL FIBRILLATION, PERSISTENT (HCC): Primary | ICD-10-CM

## 2021-05-12 PROCEDURE — 99213 OFFICE O/P EST LOW 20 MIN: CPT | Performed by: NURSE PRACTITIONER

## 2021-05-12 NOTE — PROGRESS NOTES
"    CARDIOLOGY        Patient Name: Anjum Palma Jr  :1955  Age: 65 y.o.  Primary Cardiologist: Layton Reynolds MD  Encounter Provider:  JUAN CARLOS Delatorre    Date of Service: 21          CHIEF COMPLAINT / REASON FOR OFFICE VISIT     Atrial Fibrillation (1 month f/u )      HISTORY OF PRESENT ILLNESS       HPI  Anjum Palma Jr is a 65 y.o. male who presents today for reevaluation of hypotension after medication changes.    Pt has a  history significant for persistent atrial fibrillation, hypertension, LBBB, CHARLY, prostate cancer.    At visit 1 month ago patient was experiencing episodes of lightheadedness with low blood pressure.  At that time his Inspra dosing was decreased.  Patient reports since the spironolactone was decreased his lightheadedness has completely resolved.  He states that in the mornings his blood pressure is always very well controlled in the 100s-110s.  He states that he intermittently checks it in afternoon and it is sometimes high in the 140s.  He did not bring with him his medication list to know the timing of all of his medications.  He states that he is otherwise asymptomatic and denies any chest discomfort, shortness of breath, dyspnea with exertion, palpitations, lower extremity edema, fatigue.    The following portions of the patient's history were reviewed and updated as appropriate: allergies, current medications, past family history, past medical history, past social history, past surgical history and problem list.      VITAL SIGNS     Visit Vitals  /82 (BP Location: Left arm, Patient Position: Sitting, Cuff Size: Adult)   Pulse 63   Ht 185.4 cm (73\")   Wt 92.1 kg (203 lb)   SpO2 99%   BMI 26.78 kg/m²         Wt Readings from Last 3 Encounters:   21 92.1 kg (203 lb)   21 96.6 kg (213 lb)   21 106 kg (234 lb)     Body mass index is 26.78 kg/m².      REVIEW OF SYSTEMS   Review of Systems   Constitutional: Negative for chills, fever, weight " gain and weight loss.   Cardiovascular: Negative for leg swelling.   Respiratory: Negative for cough, snoring and wheezing.    Hematologic/Lymphatic: Negative for bleeding problem. Does not bruise/bleed easily.   Skin: Negative for color change.   Musculoskeletal: Negative for falls, joint pain and myalgias.   Gastrointestinal: Negative for melena.   Genitourinary: Negative for hematuria.   Neurological: Negative for excessive daytime sleepiness and light-headedness.   Psychiatric/Behavioral: Negative for depression. The patient is not nervous/anxious.            PHYSICAL EXAMINATION     Constitutional:       Appearance: Normal appearance. Well-developed.   Eyes:      Conjunctiva/sclera: Conjunctivae normal.   Neck:      Vascular: No carotid bruit.   Pulmonary:      Effort: Pulmonary effort is normal.      Breath sounds: Normal breath sounds.   Cardiovascular:      Normal rate. Irregularly irregular rhythm. Normal S1. Normal S2.      Murmurs: There is no murmur.      No gallop. No click. No rub.   Edema:     Peripheral edema absent.   Musculoskeletal: Normal range of motion.      Comments: No pedal edema Skin:     General: Skin is warm and dry.   Neurological:      Mental Status: Alert and oriented to person, place, and time.      GCS: GCS eye subscore is 4. GCS verbal subscore is 5. GCS motor subscore is 6.   Psychiatric:         Speech: Speech normal.         Behavior: Behavior normal.         Thought Content: Thought content normal.         Judgment: Judgment normal.           REVIEWED DATA     Procedures    Cardiac Procedures:  1. Echocardiogram 3/20/2017.  The LV was mildly dilated.  Mild LVH.  EF 50-55%.  Mild tricuspid valve regurgitation.  Left atrium was moderately dilated.  Right atrium moderately to severely dilated.  2. Echocardiogram 4/27/2020.  LVEF 50-55%.  LV cavity mildly dilated.  Mild LVH.  Abnormal septal wall motion from LBBB.  Abnormal LV diastolic dysfunction with elevated LA pressure in the  setting of A. fib.  Trace to mild TR otherwise no significant valvular stenosis or regurgitation.  Estimated RVSP less than 35 mmHg.  Patient in atrial fibrillation.    Lipid Panel    Lipid Panel 9/1/20   Total Cholesterol 203 (A)   Triglycerides 278 (A)   HDL Cholesterol 55   VLDL Cholesterol 56 (A)   LDL Cholesterol  92   (A) Abnormal value                ASSESSMENT & PLAN      Diagnosis Plan   1. Atrial fibrillation, persistent (CMS/HCC)     2. Essential hypertension     3. LBBB (left bundle branch block)           SUMMARY/DISCUSSION  1. Persistent atrial fibrillation.  Patient's heart rate has been controlled.  Patient has been in persistent A. fib for some time.  He will continue metoprolol succinate 50 mg/day for rate and rhythm control.  Patient anticoagulated with Xarelto and not having any bleeding complications.  He does report a history of microscopic hematuria that was present prior to initiating Xarelto.  He has not noted any gross hematuria.  2. Hypertension.  Blood pressure is more controlled today at 106/82.  He states that since decreasing his Inspra he has not experienced any further episodes of lightheadedness or dizziness.  He will continue amlodipine 5 mg/day, Inspra 25 mg twice per day, HCTZ 25 mg/day, losartan 50 mg twice per day, metoprolol succinate 50 mg/day.  He does note that sometimes in the afternoon his blood pressure is elevated in the 140s.  He will look at the timing of his daily medications to make sure that they are even throughout the day and make adjustments as necessary.  He will keep in contact with me via [employer] in regards to his blood pressure readings.  3. LBBB  4. Patient will follow up with Dr. Reynolds in 4 months for reevaluation.  He will call the office for questions or concerns.        MEDICATIONS         Discharge Medications          Accurate as of May 12, 2021  9:53 AM. If you have any questions, ask your nurse or doctor.            Continue These Medications       Instructions Start Date   acetaminophen 650 MG 8 hr tablet  Commonly known as: TYLENOL   1,300 mg, Oral, 2 Times Daily      amLODIPine 5 MG tablet  Commonly known as: NORVASC   5 mg, Oral, Daily      azelastine 0.1 % nasal spray  Commonly known as: ASTELIN   2 sprays, Nasal, 2 Times Daily, Use in each nostril as directed       cyclobenzaprine 10 MG tablet  Commonly known as: FLEXERIL   1 tablet, Oral, Daily PRN      eplerenone 25 MG tablet  Commonly known as: Inspra   25 mg, Oral, 2 Times Daily      ergocalciferol 1.25 MG (53679 UT) capsule  Commonly known as: ERGOCALCIFEROL   50,000 Units, Oral      vitamin D 1.25 MG (58340 UT) capsule capsule  Commonly known as: ERGOCALCIFEROL   50,000 Units, Oral, Every 7 Days,        flunisolide 25 MCG/ACT (0.025%) solution nasal spray  Commonly known as: NASALIDE   Nasal, Daily      hydroCHLOROthiazide 25 MG tablet  Commonly known as: HYDRODIURIL   1 tablet, Oral, Daily      losartan 50 MG tablet  Commonly known as: COZAAR   50 mg, Oral, 2 Times Daily      metoprolol succinate XL 50 MG 24 hr tablet  Commonly known as: TOPROL-XL   50 mg, Oral, Daily      montelukast 10 MG tablet  Commonly known as: SINGULAIR   1 tablet, Oral, Daily      Potassium Citrate ER 15 MEQ (1620 MG) tablet controlled-release   1 tablet, Oral, 3 Times Daily      rivaroxaban 20 MG tablet  Commonly known as: Xarelto   20 mg, Oral, Daily With Dinner      sildenafil 20 MG tablet  Commonly known as: REVATIO   20 mg, Oral, As Needed      tamsulosin 0.4 MG capsule 24 hr capsule  Commonly known as: FLOMAX   0.4 mg, Oral, Daily      Xyzal 5 MG tablet  Generic drug: levocetirizine   5 mg, Oral, Every Evening                 **Dragon Disclaimer:   Much of this encounter note is an electronic transcription/translation of spoken language to printed text. The electronic translation of spoken language may permit erroneous, or at times, nonsensical words or phrases to be inadvertently transcribed. Although I have  reviewed the note for such errors, some may still exist.

## 2021-06-23 RX ORDER — METOPROLOL SUCCINATE 50 MG/1
TABLET, EXTENDED RELEASE ORAL
Qty: 90 TABLET | Refills: 3 | Status: SHIPPED | OUTPATIENT
Start: 2021-06-23 | End: 2022-06-09

## 2021-08-09 RX ORDER — RIVAROXABAN 20 MG/1
TABLET, FILM COATED ORAL
Qty: 90 TABLET | Refills: 3 | Status: SHIPPED | OUTPATIENT
Start: 2021-08-09 | End: 2022-07-25

## 2021-08-12 ENCOUNTER — OFFICE VISIT (OUTPATIENT)
Dept: CARDIOLOGY | Facility: CLINIC | Age: 66
End: 2021-08-12

## 2021-08-12 VITALS
OXYGEN SATURATION: 98 % | DIASTOLIC BLOOD PRESSURE: 78 MMHG | HEIGHT: 73 IN | WEIGHT: 209 LBS | BODY MASS INDEX: 27.7 KG/M2 | SYSTOLIC BLOOD PRESSURE: 108 MMHG | HEART RATE: 61 BPM

## 2021-08-12 DIAGNOSIS — I48.11 LONGSTANDING PERSISTENT ATRIAL FIBRILLATION (HCC): ICD-10-CM

## 2021-08-12 DIAGNOSIS — Z82.49 FAMILY HISTORY OF CORONARY ARTERY DISEASE: Primary | ICD-10-CM

## 2021-08-12 DIAGNOSIS — I10 ESSENTIAL HYPERTENSION: ICD-10-CM

## 2021-08-12 DIAGNOSIS — I44.7 LBBB (LEFT BUNDLE BRANCH BLOCK): ICD-10-CM

## 2021-08-12 PROCEDURE — 99213 OFFICE O/P EST LOW 20 MIN: CPT | Performed by: INTERNAL MEDICINE

## 2021-08-14 NOTE — PROGRESS NOTES
Date of Office Visit: 2021  Encounter Provider: Catrachito Reynolds MD  Place of Service: [facility] CARDIOLOGY  Patient Name: Anjum Palma Jr  :1955    Chief complaint: Follow-up for persistent atrial fibrillation, hypertension, and left   bundle branch block.    History of Present Illness:    Dear Vishnu:      I again had the pleasure of seeing your patient in cardiology office on 2021.  As you   well know, he is a very pleasant 66 year-old white male with a past medical history  significant for pesistent atrial fibrillation and hypertension who presents for follow-up.  The patient was found to have an abnormal EKG during a routine physical on 2012.    He subsequently presented for an outpatient Cardiolite stress test on 2012. At that   time, his heart rate was irregular and he was found to be in atrial fibrillation by EKG.  He   was completely unaware that he was in atrial fibrillation, and was essentially   asymptomatic. He did complete his stress test at that time, and the scintigraphic portion   showed no evidence of ischemia or infarction. He was initiated on Xarelto, and saw me   in the office on 2012 after his stress test. He was also placed on Toprol XL, and   subsequently underwent a transesophageal echocardiogram on 2012. At the time   of his VIOLET, he was noted to be in normal sinus rhythm. His transesophageal   echocardiogram showed mild septal hypokinesis and an ejection fraction of 55-60%. He   did not have any significant valvular anomalies and his left atrial size was normal.      The patient presented back in atrial fibrillation at his office visit on 2013. At that time,   I advised him to undergo another transesophageal echocardiogram. He underwent this   examination on 2013 and it was negative for any thrombus formation. He was  cardioverted with a single 150 joules shock on a biphasic defibrillator. He was placed  on  amiodarone for a short time afterwards and was transitioned to Multaq. He again   reverted back to atrial fibrillation afterwards.  He has also had severe and difficult to   control hypertension in the past.     The patient presents today for follow-up.  He has been exercising, and has actually lost   almost 30 pounds.  His blood pressure has responded well, and it is now under control   and actually lower at times.  He has not had any chest pain or shortness of breath.  He   is exercising 30 minutes at a time on the [employer] machine.    Past Medical History:   Diagnosis Date   • Degeneration of cervical intervertebral disc    • Hypertension    • LBBB (left bundle branch block)     Diagnosed 3/14/17   • Nephrolithiasis     Recurrent, s/p multiple lithotripsy procedures   • CHARLY on CPAP    • Osteoarthritis    • Persistent atrial fibrillation (CMS/HCC)    • Prostate cancer (CMS/HCC)     Early stage prostate cancer diagnosed late 2014.  Completed 43 radiation treatments on 5/18/15.   • Seasonal allergic reaction        Past Surgical History:   Procedure Laterality Date   • OTHER SURGICAL HISTORY      Multiple Lithotripsy procedures       Current Outpatient Medications on File Prior to Visit   Medication Sig Dispense Refill   • acetaminophen (TYLENOL) 650 MG 8 hr tablet Take 1,300 mg by mouth 2 (Two) Times a Day.     • amLODIPine (NORVASC) 5 MG tablet Take 1 tablet by mouth Daily. 90 tablet 3   • azelastine (ASTELIN) 0.1 % nasal spray 2 sprays into each nostril 2 (Two) Times a Day. Use in each nostril as directed     • cyclobenzaprine (FLEXERIL) 10 MG tablet Take 1 tablet by mouth daily as needed.     • eplerenone (Inspra) 25 MG tablet Take 1 tablet by mouth 2 (Two) Times a Day. 180 tablet 3   • ergocalciferol (ERGOCALCIFEROL) 1.25 MG (83115 UT) capsule Take 50,000 Units by mouth.     • flunisolide (NASALIDE) 25 MCG/ACT (0.025%) solution nasal spray into each nostril Daily.     • hydrochlorothiazide (HYDRODIURIL)  25 MG tablet Take 1 tablet by mouth daily.     • levocetirizine (XYZAL) 5 MG tablet Take 5 mg by mouth Every Evening.     • losartan (COZAAR) 50 MG tablet Take 1 tablet by mouth 2 (Two) Times a Day. 180 tablet 3   • metoprolol succinate XL (TOPROL-XL) 50 MG 24 hr tablet TAKE 1 TABLET BY MOUTH EVERY DAY 90 tablet 3   • montelukast (SINGULAIR) 10 MG tablet Take 1 tablet by mouth daily.     • Potassium Citrate ER 15 MEQ (1620 MG) tablet controlled-release Take 1 tablet by mouth 3 (Three) Times a Day.     • tamsulosin (FLOMAX) 0.4 MG capsule 24 hr capsule Take 0.4 mg by mouth Daily.     • vitamin D (ERGOCALCIFEROL) 1.25 MG (54662 UT) capsule capsule Take 50,000 Units by mouth Every 7 (Seven) Days.       • Xarelto 20 MG tablet TAKE 1 TABLET BY MOUTH EVERY DAY WITH DINNER 90 tablet 3     No current facility-administered medications on file prior to visit.     Allergies as of 08/12/2021   • (No Known Allergies)     Social History     Socioeconomic History   • Marital status:      Spouse name: Not on file   • Number of children: Not on file   • Years of education: Not on file   • Highest education level: Not on file   Tobacco Use   • Smoking status: Never Smoker   • Smokeless tobacco: Never Used   Substance and Sexual Activity   • Alcohol use: Yes     Comment: Cut back on use significantly/  occ   • Drug use: No     Family History   Problem Relation Age of Onset   • Atrial fibrillation Mother          also s/p ICD placement   • Coronary artery disease Father         Father with CABG in his 50's, and had subsequent coronary stent placement.   • Uterine cancer Sister    • Heart failure Paternal Uncle        Review of Systems   Constitutional: Positive for weight loss.   HENT: Positive for sore throat.    Musculoskeletal: Positive for joint pain.   Genitourinary: Positive for frequency.   All other systems reviewed and are negative.     Objective:     Vitals:    08/12/21 1254   BP: 108/78   BP Location: Left arm  "  Pulse: 61   SpO2: 98%   Weight: 94.8 kg (209 lb)   Height: 185.4 cm (73\")     Body mass index is 27.57 kg/m².    Physical Exam  Constitutional:       Appearance: He is well-developed.   HENT:      Head: Normocephalic and atraumatic.   Eyes:      Conjunctiva/sclera: Conjunctivae normal.   Cardiovascular:      Rate and Rhythm: Normal rate. Rhythm irregularly irregular.      Heart sounds: No murmur heard.   No friction rub. No gallop.    Pulmonary:      Effort: Pulmonary effort is normal.      Breath sounds: Normal breath sounds.   Abdominal:      Palpations: Abdomen is soft.      Tenderness: There is no abdominal tenderness.   Musculoskeletal:      Cervical back: Neck supple.   Skin:     General: Skin is warm.   Neurological:      Mental Status: He is alert and oriented to person, place, and time.   Psychiatric:         Behavior: Behavior normal.       Lab Review:   Procedures  Cardiac Procedures:  1.  Echocardiogram on 3/20/2017: The left ventricle was mildly dilated.  There was mild   LVH.  Ejection fraction was 50-55%.  There was mild tricuspid regurgitation.  The left   atrium was moderately dilated.  The right atrium was moderately to severely dilated.  2.  Echocardiogram on 4/27/2020: Ejection fraction was 50 to 55%.  There was mild   concentric left ventricular hypertrophy.  There was abnormal septal motion consistent   with a left bundle branch block.  There was no significant valvular disease.  3.  CT angiogram of the renal arteries on 4/27/2020: No renal artery stenosis.    Assessment:       Diagnosis Plan   1. Family history of coronary artery disease  CT Cardiac Calcium Score Without Dye   2. Longstanding persistent atrial fibrillation (CMS/HCC)     3. LBBB (left bundle branch block)     4. Essential hypertension       Plan:       His blood pressure has been extremely difficult to control in the past.  However, with the weight loss recently, it has come down nicely.  In fact, it has gotten low on several " occasions.  He had a CT angiogram of his renal arteries recently which showed no significant renal artery stenosis.  His current regimen consist of amlodipine 5 mg/day, Inspra 25 mg twice a day, HCTZ 25 mg/day, losartan 50 mg twice a day, and Toprol-XL 50 mg/day.  He is also very faithful about wearing his CPAP machine at night, which will also help with blood pressure control.    His rate is controlled on the Toprol-XL, and he has had no bleeding issues with taking the Xarelto.  He remains asymptomatic from the atrial fibrillation.  He is exercising routinely at the gym without any chest discomfort.  He did 30 minutes on the elliptical earlier today and does this routinely.  Again, he has lost about 30 pounds, which has helped everything.  He does have a significant family history of coronary artery disease with his father having a CABG.  I am going to check a coronary calcium CT scan to see if there is any significant calcium burden.  If so, this might lead me to treat him more aggressively for coronary artery disease (he does not carry this diagnosis currently).    Otherwise, I will see him back in 6 months unless other issues arise.

## 2021-09-10 ENCOUNTER — HOSPITAL ENCOUNTER (OUTPATIENT)
Dept: CT IMAGING | Facility: HOSPITAL | Age: 66
Discharge: HOME | End: 2021-09-10
Admitting: INTERNAL MEDICINE

## 2021-09-10 DIAGNOSIS — Z82.49 FAMILY HISTORY OF CORONARY ARTERY DISEASE: ICD-10-CM

## 2021-09-10 PROCEDURE — 75571 CT HRT W/O DYE W/CA TEST: CPT

## 2021-11-09 RX ORDER — AMLODIPINE BESYLATE 5 MG/1
TABLET ORAL
Qty: 180 TABLET | Refills: 3 | Status: SHIPPED | OUTPATIENT
Start: 2021-11-09 | End: 2022-05-02

## 2022-01-11 ENCOUNTER — APPOINTMENT (OUTPATIENT)
Dept: SLEEP MEDICINE | Facility: HOSPITAL | Age: 67
End: 2022-01-11

## 2022-01-25 ENCOUNTER — OFFICE VISIT (OUTPATIENT)
Dept: SLEEP MEDICINE | Facility: HOSPITAL | Age: 67
End: 2022-01-25

## 2022-01-25 VITALS
HEIGHT: 73 IN | DIASTOLIC BLOOD PRESSURE: 76 MMHG | HEART RATE: 70 BPM | OXYGEN SATURATION: 100 % | WEIGHT: 213 LBS | BODY MASS INDEX: 28.23 KG/M2 | SYSTOLIC BLOOD PRESSURE: 133 MMHG

## 2022-01-25 DIAGNOSIS — G47.33 OSA (OBSTRUCTIVE SLEEP APNEA): Primary | ICD-10-CM

## 2022-01-25 PROCEDURE — G0463 HOSPITAL OUTPT CLINIC VISIT: HCPCS

## 2022-01-25 NOTE — PROGRESS NOTES
"Sleep clinic follow-up visit    Anjum Palma Jr  :1955   [id number]    DATE OF SERVICE: 2022     HISTORY: The patient is a 66 y.o. white male with moderately severe obstructive sleep apnea syndrome.     Polysomnography in the past has revealed apnea-hypopnea index of AHI 24.2 per sleep hour with minimum SpO2 of 70%. He is on auto CPAP therapy. During supine position he has very severe obstructive sleep apnea syndrome with Supine AHI of 30.3, in nonsupine position 3.5 per sleep hour, and during REM sleep AHI of 68.1 per sleep hour. He also has periodic limb movement disorder.     The patient now has a [employer] dream station 2 auto CPAP machine which she got it in .  The compliance data indicate excellent compliance with 86.7% usage for more than 4 hours with an average usage of 7 hours and 19 minutes and auto CPAP mean pressure 6.7 cm of water and maximum pressure 9.9 cm of water and residual AHI 6.4.  The patient is compliant and benefiting from it.  The patient's [place] Sleepiness Scale score is 4 with CPAP therapy.    Full face mask fits good.  DME company: [employer]    PMH, PSH, Medications, allergies, FH, SH are reviewed and updated in the chart.     10 Review of Systems - Negative.     PHYSICAL EXAMINATION:  Vitals:    22 1058   BP: 133/76   Pulse: 70   SpO2: 100%   Weight: 96.6 kg (213 lb)   Height: 185.4 cm (73\")     Well-developed, well-nourished in no apparent distress and is awake alert and oriented with normal speech and language function.    Assessment and plan: Patient with severe obstructive sleep apnea syndrome successfully treated with auto CPAP therapy and is compliant and benefiting from it.  The patient had a new [employer] dream station 2 auto CPAP machine in  and is functioning well and the patient is compliant and benefiting from it.  We will continue the same.      Cristi Dobbs M.D.  2022   "

## 2022-03-22 ENCOUNTER — OFFICE VISIT (OUTPATIENT)
Dept: CARDIOLOGY | Facility: CLINIC | Age: 67
End: 2022-03-22

## 2022-03-22 VITALS
BODY MASS INDEX: 27.89 KG/M2 | SYSTOLIC BLOOD PRESSURE: 108 MMHG | HEIGHT: 73 IN | DIASTOLIC BLOOD PRESSURE: 78 MMHG | HEART RATE: 68 BPM | WEIGHT: 210.4 LBS

## 2022-03-22 DIAGNOSIS — I44.7 LBBB (LEFT BUNDLE BRANCH BLOCK): ICD-10-CM

## 2022-03-22 DIAGNOSIS — I48.11 LONGSTANDING PERSISTENT ATRIAL FIBRILLATION: Primary | ICD-10-CM

## 2022-03-22 DIAGNOSIS — I10 PRIMARY HYPERTENSION: ICD-10-CM

## 2022-03-22 PROCEDURE — 99213 OFFICE O/P EST LOW 20 MIN: CPT | Performed by: INTERNAL MEDICINE

## 2022-03-22 PROCEDURE — 93000 ELECTROCARDIOGRAM COMPLETE: CPT | Performed by: INTERNAL MEDICINE

## 2022-04-06 ENCOUNTER — TRANSCRIBE ORDERS (OUTPATIENT)
Dept: ADMINISTRATIVE | Facility: HOSPITAL | Age: 67
End: 2022-04-06

## 2022-04-06 DIAGNOSIS — R97.21 INCREASING PROSTATE SPECIFIC ANTIGEN (PSA) LEVEL AFTER TREATMENT FOR MALIGNANT NEOPLASM OF PROSTATE: ICD-10-CM

## 2022-04-06 DIAGNOSIS — C61 PROSTATE CANCER: Primary | ICD-10-CM

## 2022-04-11 NOTE — PROGRESS NOTES
Date of Office Visit:  3/22/2022  Encounter Provider: Catrachito Reynolds MD  Place of Service: [facility] CARDIOLOGY  Patient Name: Anjum Palma Jr  :1955    Chief complaint: Follow-up for persistent atrial fibrillation, hypertension, left   bundle branch block, and mildly dilated ascending aorta.    History of Present Illness:    Dear Vishnu:      I again had the pleasure of seeing your patient in cardiology office on 3/22/2022.  As you   well know, he is a very pleasant 66 year-old white male with a past medical history  significant for pesistent atrial fibrillation and hypertension who presents for follow-up.  The patient was found to have an abnormal EKG during a routine physical on 2012.    He subsequently presented for an outpatient Cardiolite stress test on 2012. At that   time, his heart rate was irregular and he was found to be in atrial fibrillation by EKG.  He   was completely unaware that he was in atrial fibrillation, and was essentially   asymptomatic. He did complete his stress test at that time, and the scintigraphic portion   showed no evidence of ischemia or infarction. He was initiated on Xarelto, and saw me   in the office on 2012 after his stress test. He was also placed on Toprol XL, and   subsequently underwent a transesophageal echocardiogram on 2012. At the time   of his VIOLET, he was noted to be in normal sinus rhythm. His transesophageal   echocardiogram showed mild septal hypokinesis and an ejection fraction of 55-60%. He   did not have any significant valvular anomalies and his left atrial size was normal.      The patient presented back in atrial fibrillation at his office visit on 2013. At that time,   I advised him to undergo another transesophageal echocardiogram. He underwent this   examination on 2013 and it was negative for any thrombus formation. He was  cardioverted with a single 150 joules shock on a biphasic  defibrillator. He was placed on  amiodarone for a short time afterwards and was transitioned to Multaq. He again   reverted back to atrial fibrillation afterwards.  He has also had severe and difficult to   control hypertension in the past.     The patient presents today for follow-up.  He is still exercising routinely maintaining his   weight loss.  His blood pressure has responded very favorably to this.  He did have a   coronary calcium CT scan on 9/10/2021 which actually showed a very low score of only   9.  He is asymptomatic from the atrial fibrillation and still taking the Xarelto without any   difficulty.  He has had no chest pain or shortness of breath.    Past Medical History:   Diagnosis Date   • Degeneration of cervical intervertebral disc    • Hypertension    • LBBB (left bundle branch block)     Diagnosed 3/14/17   • Nephrolithiasis     Recurrent, s/p multiple lithotripsy procedures   • CHARLY on CPAP    • Osteoarthritis    • Persistent atrial fibrillation (HCC)    • Prostate cancer (HCC)     Early stage prostate cancer diagnosed late 2014.  Completed 43 radiation treatments on 5/18/15.   • Seasonal allergic reaction        Past Surgical History:   Procedure Laterality Date   • OTHER SURGICAL HISTORY      Multiple Lithotripsy procedures       Current Outpatient Medications on File Prior to Visit   Medication Sig Dispense Refill   • acetaminophen (TYLENOL) 650 MG 8 hr tablet Take 1,300 mg by mouth 2 (Two) Times a Day.     • amLODIPine (NORVASC) 5 MG tablet TAKE 1 TABLET BY MOUTH TWICE A DAY (Patient taking differently: 5 mg Daily.) 180 tablet 3   • azelastine (ASTELIN) 0.1 % nasal spray 2 sprays into the nostril(s) as directed by provider 2 (Two) Times a Day. Use in each nostril as directed     • cyclobenzaprine (FLEXERIL) 10 MG tablet Take 1 tablet by mouth daily as needed.     • eplerenone (Inspra) 25 MG tablet Take 1 tablet by mouth 2 (Two) Times a Day. 180 tablet 3   • ergocalciferol (ERGOCALCIFEROL)  1.25 MG (80064 UT) capsule Take 50,000 Units by mouth.     • flunisolide (NASALIDE) 25 MCG/ACT (0.025%) solution nasal spray into each nostril Daily.     • HM LIDOCAINE PATCH EX Apply  topically As Needed.     • hydrochlorothiazide (HYDRODIURIL) 25 MG tablet Take 1 tablet by mouth daily.     • levocetirizine (XYZAL) 5 MG tablet Take 5 mg by mouth Every Evening.     • losartan (COZAAR) 50 MG tablet Take 1 tablet by mouth 2 (Two) Times a Day. 180 tablet 3   • metoprolol succinate XL (TOPROL-XL) 50 MG 24 hr tablet TAKE 1 TABLET BY MOUTH EVERY DAY 90 tablet 3   • montelukast (SINGULAIR) 10 MG tablet Take 1 tablet by mouth daily.     • Potassium Citrate ER 15 MEQ (1620 MG) tablet controlled-release Take 1 tablet by mouth 3 (Three) Times a Day.     • tamsulosin (FLOMAX) 0.4 MG capsule 24 hr capsule Take 0.4 mg by mouth Daily.     • vitamin D (ERGOCALCIFEROL) 1.25 MG (97056 UT) capsule capsule Take 50,000 Units by mouth Every 7 (Seven) Days.       • Xarelto 20 MG tablet TAKE 1 TABLET BY MOUTH EVERY DAY WITH DINNER 90 tablet 3     No current facility-administered medications on file prior to visit.     Allergies as of 03/22/2022   • (No Known Allergies)     Social History     Socioeconomic History   • Marital status:    Tobacco Use   • Smoking status: Never Smoker   • Smokeless tobacco: Never Used   • Tobacco comment: caffeine use- rare   Substance and Sexual Activity   • Alcohol use: Yes     Comment: Cut back on use significantly/  occ   • Drug use: No     Family History   Problem Relation Age of Onset   • Atrial fibrillation Mother          also s/p ICD placement   • Coronary artery disease Father         Father with CABG in his 50's, and had subsequent coronary stent placement.   • Uterine cancer Sister    • Heart failure Paternal Uncle        Review of Systems   Musculoskeletal: Positive for joint pain.   Genitourinary: Positive for frequency.   All other systems reviewed and are negative.     Objective:  "    Vitals:    03/22/22 0834   BP: 108/78   BP Location: Right arm   Pulse: 68   Weight: 95.4 kg (210 lb 6.4 oz)   Height: 185.4 cm (73\")     Body mass index is 27.76 kg/m².    Physical Exam  Constitutional:       Appearance: He is well-developed.   HENT:      Head: Normocephalic and atraumatic.   Eyes:      Conjunctiva/sclera: Conjunctivae normal.   Cardiovascular:      Rate and Rhythm: Normal rate. Rhythm irregularly irregular.      Heart sounds: No murmur heard.    No friction rub. No gallop.   Pulmonary:      Effort: Pulmonary effort is normal.      Breath sounds: Normal breath sounds.   Abdominal:      Palpations: Abdomen is soft.      Tenderness: There is no abdominal tenderness.   Musculoskeletal:      Cervical back: Neck supple.   Skin:     General: Skin is warm.   Neurological:      Mental Status: He is alert and oriented to person, place, and time.   Psychiatric:         Behavior: Behavior normal.       Lab Review:     ECG 12 Lead    Date/Time: 3/22/2022 8:24 AM  Performed by: Catrachito Reynolds MD  Authorized by: Catrachito Reynolds MD   Comparison: compared with previous ECG from 3/25/2020  Similar to previous ECG  Rhythm: atrial fibrillation  BPM: 68  Conduction: left bundle branch block    Clinical impression: abnormal EKG          Cardiac Procedures:  1.  Echocardiogram on 3/20/2017: The left ventricle was mildly dilated.  There was mild   LVH.  Ejection fraction was 50-55%.  There was mild tricuspid regurgitation.  The left   atrium was moderately dilated.  The right atrium was moderately to severely dilated.  2.  Echocardiogram on 4/27/2020: Ejection fraction was 50 to 55%.  There was mild   concentric left ventricular hypertrophy.  There was abnormal septal motion consistent   with a left bundle branch block.  There was no significant valvular disease.  3.  CT angiogram of the renal arteries on 4/27/2020: No renal artery stenosis.  4.  Coronary calcium CT scan on 9/10/2021: Total score of 9.  " Left circumflex had a   score of 9.  All other vessels have a score of 0.  The ascending aorta was mildly   enlarged at 3.9 cm.    Assessment:       Diagnosis Plan   1. Longstanding persistent atrial fibrillation (HCC)     2. Primary hypertension     3. LBBB (left bundle branch block)       Plan:       Again, he seems to be doing very well from a cardiac perspective.  His coronary calcium scan showed only a score of 9, all in the left circumflex.  His ascending aorta was very mildly dilated at 3.9 cm.  This will need to be reevaluated within the next 2 years to ensure it is not enlarging.  His PSA has been going up again, unfortunately.  This is being followed by urology.    His blood pressure has been extremely difficult to control in the past.  However, with the weight loss recently, it has come down nicely.  He had a CT angiogram of his renal arteries recently which showed no significant renal artery stenosis.  His current regimen consist of amlodipine 5 mg/day, Inspra 25 mg twice a day, HCTZ 25 mg/day, losartan 50 mg twice a day, and Toprol-XL 50 mg/day.  He is also very faithful about wearing his CPAP machine at night, which will also help with blood pressure control.    His rate is controlled on the Toprol-XL, and he has had no bleeding issues with taking the Xarelto.  He remains asymptomatic from the atrial fibrillation.  He is exercising routinely at the gym without any chest discomfort.  He routinely does the elliptical machine for at least 30 minutes at a time.  The left bundle is causing no issues currently.  For now, I will plan on seeing him back in the office in 6 months unless other issues arise.

## 2022-05-02 RX ORDER — AMLODIPINE BESYLATE 5 MG/1
TABLET ORAL
Qty: 90 TABLET | Refills: 3 | Status: SHIPPED | OUTPATIENT
Start: 2022-05-02 | End: 2022-09-26 | Stop reason: SDUPTHER

## 2022-05-20 ENCOUNTER — APPOINTMENT (OUTPATIENT)
Dept: MRI IMAGING | Facility: HOSPITAL | Age: 67
End: 2022-05-20

## 2022-06-02 ENCOUNTER — HOSPITAL ENCOUNTER (OUTPATIENT)
Dept: MRI IMAGING | Facility: HOSPITAL | Age: 67
Discharge: HOME | End: 2022-06-02
Admitting: UROLOGY

## 2022-06-02 DIAGNOSIS — R97.21 INCREASING PROSTATE SPECIFIC ANTIGEN (PSA) LEVEL AFTER TREATMENT FOR MALIGNANT NEOPLASM OF PROSTATE: ICD-10-CM

## 2022-06-02 DIAGNOSIS — C61 PROSTATE CANCER: ICD-10-CM

## 2022-06-02 PROCEDURE — 0 GADOBENATE DIMEGLUMINE 529 MG/ML SOLUTION: Performed by: UROLOGY

## 2022-06-02 PROCEDURE — 82565 ASSAY OF CREATININE: CPT

## 2022-06-02 PROCEDURE — 72197 MRI PELVIS W/O & W/DYE: CPT

## 2022-06-02 PROCEDURE — A9577 INJ MULTIHANCE: HCPCS | Performed by: UROLOGY

## 2022-06-02 RX ADMIN — GADOBENATE DIMEGLUMINE 20 ML: 529 INJECTION, SOLUTION INTRAVENOUS at 12:29

## 2022-06-05 LAB — CREAT BLDA-MCNC: 1.3 MG/DL (ref 0.6–1.3)

## 2022-06-07 ENCOUNTER — TRANSCRIBE ORDERS (OUTPATIENT)
Dept: ADMINISTRATIVE | Facility: HOSPITAL | Age: 67
End: 2022-06-07

## 2022-06-07 DIAGNOSIS — R93.89 ABNORMAL FINDINGS ON IMAGING TEST: Primary | ICD-10-CM

## 2022-06-09 RX ORDER — LOSARTAN POTASSIUM 50 MG/1
TABLET ORAL
Qty: 180 TABLET | Refills: 3 | Status: SHIPPED | OUTPATIENT
Start: 2022-06-09 | End: 2022-09-26 | Stop reason: SDUPTHER

## 2022-06-09 RX ORDER — METOPROLOL SUCCINATE 50 MG/1
TABLET, EXTENDED RELEASE ORAL
Qty: 90 TABLET | Refills: 3 | Status: SHIPPED | OUTPATIENT
Start: 2022-06-09 | End: 2022-09-26 | Stop reason: SDUPTHER

## 2022-07-06 ENCOUNTER — HOSPITAL ENCOUNTER (OUTPATIENT)
Dept: MRI IMAGING | Facility: HOSPITAL | Age: 67
Discharge: HOME | End: 2022-07-06
Admitting: UROLOGY

## 2022-07-06 DIAGNOSIS — R93.89 ABNORMAL FINDINGS ON IMAGING TEST: ICD-10-CM

## 2022-07-06 PROCEDURE — 82565 ASSAY OF CREATININE: CPT

## 2022-07-06 PROCEDURE — 72197 MRI PELVIS W/O & W/DYE: CPT

## 2022-07-06 PROCEDURE — 0 GADOBENATE DIMEGLUMINE 529 MG/ML SOLUTION: Performed by: UROLOGY

## 2022-07-06 PROCEDURE — A9577 INJ MULTIHANCE: HCPCS | Performed by: UROLOGY

## 2022-07-06 RX ADMIN — GADOBENATE DIMEGLUMINE 20 ML: 529 INJECTION, SOLUTION INTRAVENOUS at 20:25

## 2022-07-09 LAB — CREAT BLDA-MCNC: 1 MG/DL (ref 0.6–1.3)

## 2022-07-13 ENCOUNTER — TRANSCRIBE ORDERS (OUTPATIENT)
Dept: ADMINISTRATIVE | Facility: HOSPITAL | Age: 67
End: 2022-07-13

## 2022-07-13 DIAGNOSIS — C61 CANCER OF PROSTATE: Primary | ICD-10-CM

## 2022-07-13 DIAGNOSIS — R97.21 RISING PSA FOLLOWING TREATMENT FOR MALIGNANT NEOPLASM OF PROSTATE: ICD-10-CM

## 2022-07-18 ENCOUNTER — TELEPHONE (OUTPATIENT)
Dept: CARDIOLOGY | Facility: CLINIC | Age: 67
End: 2022-07-18

## 2022-07-18 RX ORDER — EPLERENONE 25 MG/1
25 TABLET, FILM COATED ORAL 2 TIMES DAILY
Qty: 180 TABLET | Refills: 3 | Status: SHIPPED | OUTPATIENT
Start: 2022-07-18 | End: 2022-09-26 | Stop reason: SDUPTHER

## 2022-07-18 NOTE — TELEPHONE ENCOUNTER
----- Message from Anjum Palma Jr sent at 2022 10:07 AM EDT -----  Regarding: Prescription for Eplerenone  I need a new prescription for Eplerenone 25 mg, 1 per day. My old old one . I use the [facility] at [address] [place] [address].   I appreciate your assistance on this.   Anjum Palma

## 2022-07-20 ENCOUNTER — HOSPITAL ENCOUNTER (OUTPATIENT)
Dept: NUCLEAR MEDICINE | Facility: HOSPITAL | Age: 67
Discharge: HOME | End: 2022-07-20

## 2022-07-20 DIAGNOSIS — C61 CANCER OF PROSTATE: ICD-10-CM

## 2022-07-20 DIAGNOSIS — R97.21 RISING PSA FOLLOWING TREATMENT FOR MALIGNANT NEOPLASM OF PROSTATE: ICD-10-CM

## 2022-07-20 PROCEDURE — 0 TECHNETIUM MEDRONATE KIT: Performed by: UROLOGY

## 2022-07-20 PROCEDURE — A9503 TC99M MEDRONATE: HCPCS | Performed by: UROLOGY

## 2022-07-20 PROCEDURE — 78306 BONE IMAGING WHOLE BODY: CPT

## 2022-07-20 RX ORDER — TC 99M MEDRONATE 20 MG/10ML
23 INJECTION, POWDER, LYOPHILIZED, FOR SOLUTION INTRAVENOUS
Status: COMPLETED | OUTPATIENT
Start: 2022-07-20 | End: 2022-07-20

## 2022-07-20 RX ADMIN — Medication 23 MILLICURIE: at 09:20

## 2022-07-25 RX ORDER — RIVAROXABAN 20 MG/1
TABLET, FILM COATED ORAL
Qty: 90 TABLET | Refills: 3 | Status: SHIPPED | OUTPATIENT
Start: 2022-07-25 | End: 2022-09-26 | Stop reason: SDUPTHER

## 2022-09-26 ENCOUNTER — OFFICE VISIT (OUTPATIENT)
Dept: CARDIOLOGY | Facility: CLINIC | Age: 67
End: 2022-09-26

## 2022-09-26 VITALS
WEIGHT: 228 LBS | OXYGEN SATURATION: 97 % | DIASTOLIC BLOOD PRESSURE: 80 MMHG | HEIGHT: 73 IN | SYSTOLIC BLOOD PRESSURE: 122 MMHG | HEART RATE: 66 BPM | BODY MASS INDEX: 30.22 KG/M2

## 2022-09-26 DIAGNOSIS — I10 PRIMARY HYPERTENSION: ICD-10-CM

## 2022-09-26 DIAGNOSIS — I44.7 LBBB (LEFT BUNDLE BRANCH BLOCK): ICD-10-CM

## 2022-09-26 DIAGNOSIS — G47.33 OSA (OBSTRUCTIVE SLEEP APNEA): ICD-10-CM

## 2022-09-26 DIAGNOSIS — I48.11 LONGSTANDING PERSISTENT ATRIAL FIBRILLATION: Primary | ICD-10-CM

## 2022-09-26 PROCEDURE — 99214 OFFICE O/P EST MOD 30 MIN: CPT | Performed by: NURSE PRACTITIONER

## 2022-09-26 RX ORDER — LOSARTAN POTASSIUM 50 MG/1
50 TABLET ORAL 2 TIMES DAILY
Qty: 180 TABLET | Refills: 3 | Status: SHIPPED | OUTPATIENT
Start: 2022-09-26

## 2022-09-26 RX ORDER — METOPROLOL SUCCINATE 50 MG/1
50 TABLET, EXTENDED RELEASE ORAL DAILY
Qty: 90 TABLET | Refills: 3 | Status: SHIPPED | OUTPATIENT
Start: 2022-09-26

## 2022-09-26 RX ORDER — AMLODIPINE BESYLATE 5 MG/1
5 TABLET ORAL DAILY
Qty: 90 TABLET | Refills: 3 | Status: SHIPPED | OUTPATIENT
Start: 2022-09-26

## 2022-09-26 RX ORDER — EPLERENONE 25 MG/1
25 TABLET, FILM COATED ORAL 2 TIMES DAILY
Qty: 180 TABLET | Refills: 3 | Status: SHIPPED | OUTPATIENT
Start: 2022-09-26

## 2022-09-26 NOTE — PROGRESS NOTES
"    CARDIOLOGY        Patient Name: Anjum Palma Jr  :1955  Age: 67 y.o.  Primary Cardiologist: Layton Reynolds MD  Encounter Provider:  JUAN CARLOS Delatorre    Date of Service: 21          CHIEF COMPLAINT / REASON FOR OFFICE VISIT     Atrial Fibrillation      HISTORY OF PRESENT ILLNESS       Atrial Fibrillation  Past medical history includes atrial fibrillation.     Anjum Palma Jr is a 67 y.o. male who presents today for semi-annual evaluation.    Pt has a  history significant for persistent atrial fibrillation, hypertension, LBBB, CHARLY, prostate cancer.    Patient states that he has done well since last assessment.  He feels that he is doing well from a cardiovascular standpoint.  He denies chest pain, shortness of breath, palpitations, lightheadedness, edema, fatigue.  He is doing well on all current medications.  He remains active.  Unfortunately patient adds that his prostate cancer is active again and he is under treatment for this.     The following portions of the patient's history were reviewed and updated as appropriate: allergies, current medications, past family history, past medical history, past social history, past surgical history and problem list.      VITAL SIGNS     Visit Vitals  /80 (BP Location: Right arm, Patient Position: Sitting, Cuff Size: Adult)   Pulse 66   Ht 185.4 cm (73\")   Wt 103 kg (228 lb)   SpO2 97%   BMI 30.08 kg/m²         Wt Readings from Last 3 Encounters:   22 103 kg (228 lb)   22 95.3 kg (210 lb)   22 95.4 kg (210 lb 6.4 oz)     Body mass index is 30.08 kg/m².      REVIEW OF SYSTEMS   Review of Systems   Constitutional: Negative for chills, fever, weight gain and weight loss.   Cardiovascular: Negative for leg swelling.   Respiratory: Negative for cough, snoring and wheezing.    Hematologic/Lymphatic: Negative for bleeding problem. Does not bruise/bleed easily.   Skin: Negative for color change.   Musculoskeletal: Negative for " falls, joint pain and myalgias.   Gastrointestinal: Negative for melena.   Genitourinary: Negative for hematuria.   Neurological: Negative for excessive daytime sleepiness and light-headedness.   Psychiatric/Behavioral: Negative for depression. The patient is not nervous/anxious.            PHYSICAL EXAMINATION     Constitutional:       Appearance: Normal appearance. Well-developed.   Eyes:      Conjunctiva/sclera: Conjunctivae normal.   Neck:      Vascular: No carotid bruit.   Pulmonary:      Effort: Pulmonary effort is normal.      Breath sounds: Normal breath sounds.   Cardiovascular:      Normal rate. Irregularly irregular rhythm. Normal S1. Normal S2.      Murmurs: There is no murmur.      No gallop. No click. No rub.   Edema:     Peripheral edema absent.   Musculoskeletal: Normal range of motion.      Comments: No pedal edema Skin:     General: Skin is warm and dry.   Neurological:      Mental Status: Alert and oriented to person, place, and time.      GCS: GCS eye subscore is 4. GCS verbal subscore is 5. GCS motor subscore is 6.   Psychiatric:         Speech: Speech normal.         Behavior: Behavior normal.         Thought Content: Thought content normal.         Judgment: Judgment normal.           REVIEWED DATA     Procedures    Cardiac Procedures:  1. Echocardiogram 3/20/2017.  The LV was mildly dilated.  Mild LVH.  EF 50-55%.  Mild tricuspid valve regurgitation.  Left atrium was moderately dilated.  Right atrium moderately to severely dilated.  2. Echocardiogram 4/27/2020.  LVEF 50-55%.  LV cavity mildly dilated.  Mild LVH.  Abnormal septal wall motion from LBBB.  Abnormal LV diastolic dysfunction with elevated LA pressure in the setting of A. fib.  Trace to mild TR otherwise no significant valvular stenosis or regurgitation.  Estimated RVSP less than 35 mmHg.  Patient in atrial fibrillation.          ASSESSMENT & PLAN     Diagnoses and all orders for this visit:    1. Longstanding persistent atrial  fibrillation (HCC) (Primary)  · Patient asymptomatic from cardiovascular standpoint, denies chest discomfort, dyspnea  · Heart rate remains controlled in clinic  · Continue metoprolol succinate 50 mg/day  · Anticoagulated with Xarelto and denies bleeding complications    2. Primary hypertension  · Blood pressure currently controlled at 122/80  · Continue losartan 50 mg twice daily, Inspra 25 mg twice daily, amlodipine 5 mg/day  · Reviewed most recent chemistry panel where her renal function and electrolytes are stable    3. LBBB (left bundle branch block)  · Currently stable    4. CHARLY (obstructive sleep apnea)  · Remains compliant with CPAP    Other orders  -     eplerenone (Inspra) 25 MG tablet; Take 1 tablet by mouth 2 (Two) Times a Day.  Dispense: 180 tablet; Refill: 3  -     rivaroxaban (Xarelto) 20 MG tablet; Take 1 tablet by mouth Daily With Dinner.  Dispense: 90 tablet; Refill: 3  -     amLODIPine (NORVASC) 5 MG tablet; Take 1 tablet by mouth Daily.  Dispense: 90 tablet; Refill: 3  -     losartan (COZAAR) 50 MG tablet; Take 1 tablet by mouth 2 (Two) Times a Day.  Dispense: 180 tablet; Refill: 3  -     metoprolol succinate XL (TOPROL-XL) 50 MG 24 hr tablet; Take 1 tablet by mouth Daily.  Dispense: 90 tablet; Refill: 3        Return in about 1 year (around 9/26/2023) for Dr. English, transitioning from Dr. Reynolds (patient preference).    Future Appointments       Provider Department Center    1/24/2023 9:15 AM Cristi Dobbs MD [facility] SLEEP MEDICINE     10/4/2023 10:20 AM Manuel English MD [facility]              MEDICATIONS         Discharge Medications          Accurate as of September 26, 2022  9:28 AM. If you have any questions, ask your nurse or doctor.            Changes to Medications      Instructions Start Date   rivaroxaban 20 MG tablet  Commonly known as: Xarelto  What changed: how much to take  Changed by: Kristin Severino, APRN   20  mg, Oral, Daily With Dinner         Continue These Medications      Instructions Start Date   acetaminophen 650 MG 8 hr tablet  Commonly known as: TYLENOL   1,300 mg, Oral, 2 Times Daily      amLODIPine 5 MG tablet  Commonly known as: NORVASC   5 mg, Oral, Daily      azelastine 0.1 % nasal spray  Commonly known as: ASTELIN   2 sprays, Nasal, 2 Times Daily, Use in each nostril as directed       cyclobenzaprine 10 MG tablet  Commonly known as: FLEXERIL   1 tablet, Oral, Daily PRN      eplerenone 25 MG tablet  Commonly known as: Inspra   25 mg, Oral, 2 Times Daily      flunisolide 25 MCG/ACT (0.025%) solution nasal spray  Commonly known as: NASALIDE   Nasal, Daily      HM LIDOCAINE PATCH EX   Apply externally, As Needed      hydroCHLOROthiazide 25 MG tablet  Commonly known as: HYDRODIURIL   1 tablet, Oral, Daily      levocetirizine 5 MG tablet  Commonly known as: XYZAL   5 mg, Oral, Every Evening      losartan 50 MG tablet  Commonly known as: COZAAR   50 mg, Oral, 2 Times Daily      metoprolol succinate XL 50 MG 24 hr tablet  Commonly known as: TOPROL-XL   50 mg, Oral, Daily      montelukast 10 MG tablet  Commonly known as: SINGULAIR   1 tablet, Oral, Daily      Potassium Citrate ER 15 MEQ (1620 MG) tablet controlled-release   1 tablet, Oral, 3 Times Daily      tamsulosin 0.4 MG capsule 24 hr capsule  Commonly known as: FLOMAX   0.4 mg, Oral, Daily         Stop These Medications    ergocalciferol 1.25 MG (75552 UT) capsule  Commonly known as: ERGOCALCIFEROL  Stopped by: JUAN CARLOS Delatorre     vitamin D 1.25 MG (37789 UT) capsule capsule  Commonly known as: ERGOCALCIFEROL  Stopped by: JUAN CARLOS Delatorre                **Dragon Disclaimer:   Much of this encounter note is an electronic transcription/translation of spoken language to printed text. The electronic translation of spoken language may permit erroneous, or at times, nonsensical words or phrases to be inadvertently transcribed. Although I have reviewed  the note for such errors, some may still exist.

## 2022-11-10 NOTE — PROGRESS NOTES
"    CARDIOLOGY        Patient Name: Anjum Palma Jr  :1955  Age: 65 y.o.  Primary Cardiologist: Layton Reynolds MD  Encounter Provider:  JUAN CARLOS Delatorre    Date of Service: 21          CHIEF COMPLAINT / REASON FOR OFFICE VISIT     Atrial Fibrillation (6 month f/u )      HISTORY OF PRESENT ILLNESS       HPI  Anjum Palma Jr is a 65 y.o. male who presents today for semiannual follow-up.     Pt has a  history significant for persistent atrial fibrillation, hypertension, LBBB, CHARLY, prostate cancer.    Patient reports that he has done well for the past 6 months.  He states that he began a weight loss regimen in January and has successfully lost about 30 pounds.  Since the weight loss he has noted that his BP has been lower with intermittent episodes of lightheadedness.  His BP has been averaging in the 90s-110s.  When BP is lower in the 90s, he feel lightheaded.  His HR has been controlled.  He is anticoagulated with Xarelto and not having bleeding complications. He denies chest discomfort, shortness of breath, increased fatigue. He is exercising routinely and not having any limiting symptoms.     The following portions of the patient's history were reviewed and updated as appropriate: allergies, current medications, past family history, past medical history, past social history, past surgical history and problem list.      VITAL SIGNS     Visit Vitals  /64 (BP Location: Left arm, Patient Position: Sitting, Cuff Size: Adult)   Pulse 66   Ht 185.4 cm (73\")   Wt 96.6 kg (213 lb)   SpO2 99%   BMI 28.10 kg/m²         Wt Readings from Last 3 Encounters:   21 96.6 kg (213 lb)   21 106 kg (234 lb)   20 112 kg (248 lb)     Body mass index is 28.1 kg/m².      REVIEW OF SYSTEMS   Review of Systems   Constitutional: Negative for chills, fever, weight gain and weight loss.   Cardiovascular: Negative for leg swelling.   Respiratory: Negative for cough, snoring and wheezing.  "   Hematologic/Lymphatic: Negative for bleeding problem. Does not bruise/bleed easily.   Skin: Negative for color change.   Musculoskeletal: Negative for falls, joint pain and myalgias.   Gastrointestinal: Negative for melena.   Genitourinary: Negative for hematuria.   Neurological: Positive for light-headedness. Negative for excessive daytime sleepiness.   Psychiatric/Behavioral: Negative for depression. The patient is not nervous/anxious.            PHYSICAL EXAMINATION     Constitutional:       Appearance: Normal appearance. Well-developed.   Eyes:      Conjunctiva/sclera: Conjunctivae normal.   Neck:      Vascular: No carotid bruit.   Pulmonary:      Effort: Pulmonary effort is normal.      Breath sounds: Normal breath sounds.   Cardiovascular:      Normal rate. Irregularly irregular rhythm. Normal S1. Normal S2.      Murmurs: There is no murmur.      No gallop. No click. No rub.   Edema:     Peripheral edema absent.   Musculoskeletal: Normal range of motion.      Comments: No pedal edema Skin:     General: Skin is warm and dry.   Neurological:      Mental Status: Alert and oriented to person, place, and time.      GCS: GCS eye subscore is 4. GCS verbal subscore is 5. GCS motor subscore is 6.   Psychiatric:         Speech: Speech normal.         Behavior: Behavior normal.         Thought Content: Thought content normal.         Judgment: Judgment normal.           REVIEWED DATA     Procedures    Cardiac Procedures:  1. Echocardiogram 3/20/2017.  The LV was mildly dilated.  Mild LVH.  EF 50-55%.  Mild tricuspid valve regurgitation.  Left atrium was moderately dilated.  Right atrium moderately to severely dilated.  2. Echocardiogram 4/27/2020.  LVEF 50-55%.  LV cavity mildly dilated.  Mild LVH.  Abnormal septal wall motion from LBBB.  Abnormal LV diastolic dysfunction with elevated LA pressure in the setting of A. fib.  Trace to mild TR otherwise no significant valvular stenosis or regurgitation.  Estimated RVSP  less than 35 mmHg.  Patient in atrial fibrillation.    Lipid Panel    Lipid Panel 9/1/20   Total Cholesterol 203 (A)   Triglycerides 278 (A)   HDL Cholesterol 55   VLDL Cholesterol 56 (A)   LDL Cholesterol  92   (A) Abnormal value                ASSESSMENT & PLAN      Diagnosis Plan   1. Atrial fibrillation, persistent (CMS/HCC)     2. Essential hypertension     3. LBBB (left bundle branch block)           SUMMARY/DISCUSSION  1. Persistent atrial fibrillation.  Patient's heart rate has been controlled.  Patient has been in persistent A. fib for some time.  He will continue metoprolol succinate 50 mg/day for rate and rhythm control.  Patient anticoagulated with Xarelto and not having any bleeding complications.  He does report a history of microscopic hematuria that was present prior to initiating Xarelto.  He has not noted any gross hematuria.  2. Hypertension.  Patient has now had a 30 pound weight loss intentionally over the past 3 months.  He has noted episodes where his blood pressure has been low and he has episodes of lightheadedness and near syncope.  His medications have been adjusted by Dr. Reynolds, however they are consistently averaging in the 90s to 110s systolic.  At this point I have recommended decreasing Inspra to 25 mg twice daily.  Patient will continue amlodipine 5 mg/day, HCTZ 25 mg/day, losartan 50 mg twice per day, metoprolol succinate 50 mg/day.  He will keep a blood pressure diary and will notify me for any blood pressure readings that are consistently greater than 130/85.  Prescription directions were updated and sent to his pharmacy.  3. Patient will follow up with me in 1 month and Dr. Reynolds in 4 months for reevaluation.  He will call the office for questions or concerns.        MEDICATIONS         Discharge Medications          Accurate as of April 12, 2021  1:48 PM. If you have any questions, ask your nurse or doctor.            Changes to Medications      Instructions Start Date    amLODIPine 5 MG tablet  Commonly known as: NORVASC  What changed: when to take this  Changed by: Kristin Vallesoln, APRN   5 mg, Oral, Daily      eplerenone 25 MG tablet  Commonly known as: Inspra  What changed:   · medication strength  · how much to take  · how to take this  · when to take this  · additional instructions  Changed by: Kristin Vallesoln, APRN   25 mg, Oral, 2 Times Daily      losartan 50 MG tablet  Commonly known as: COZAAR  What changed:   · medication strength  · how much to take  · when to take this  Changed by: Kristin Vallesoln, APRN   50 mg, Oral, 2 Times Daily      Potassium Citrate ER 15 MEQ (1620 MG) tablet controlled-release  What changed: Another medication with the same name was removed. Continue taking this medication, and follow the directions you see here.  Changed by: Kristin Patinon, APRN   1 tablet, Oral, 3 Times Daily         Continue These Medications      Instructions Start Date   acetaminophen 650 MG 8 hr tablet  Commonly known as: TYLENOL   1,300 mg, Oral, 2 Times Daily      azelastine 0.1 % nasal spray  Commonly known as: ASTELIN   2 sprays, Nasal, 2 Times Daily, Use in each nostril as directed       cyclobenzaprine 10 MG tablet  Commonly known as: FLEXERIL   1 tablet, Oral, Daily PRN      ergocalciferol 1.25 MG (79375 UT) capsule  Commonly known as: ERGOCALCIFEROL   50,000 Units, Oral      vitamin D 1.25 MG (27406 UT) capsule capsule  Commonly known as: ERGOCALCIFEROL   50,000 Units, Oral, Every 7 Days,        flunisolide 25 MCG/ACT (0.025%) solution nasal spray  Commonly known as: NASALIDE   Nasal, Daily      hydroCHLOROthiazide 25 MG tablet  Commonly known as: HYDRODIURIL   1 tablet, Oral, Daily      metoprolol succinate XL 50 MG 24 hr tablet  Commonly known as: TOPROL-XL   50 mg, Oral, Daily      montelukast 10 MG tablet  Commonly known as: SINGULAIR   1 tablet, Oral, Daily      rivaroxaban 20 MG tablet  Commonly known as: Xarelto   20 mg, Oral, Daily With Dinner       sildenafil 20 MG tablet  Commonly known as: REVATIO   20 mg, Oral, As Needed      tamsulosin 0.4 MG capsule 24 hr capsule  Commonly known as: FLOMAX   0.4 mg, Oral, Daily      Xyzal 5 MG tablet  Generic drug: levocetirizine   5 mg, Oral, Every Evening                 **Dragon Disclaimer:   Much of this encounter note is an electronic transcription/translation of spoken language to printed text. The electronic translation of spoken language may permit erroneous, or at times, nonsensical words or phrases to be inadvertently transcribed. Although I have reviewed the note for such errors, some may still exist.    Olumiant Pregnancy And Lactation Text: Based on animal studies, Olumiant may cause embryo-fetal harm when administered to pregnant women.  The medication should not be used in pregnancy.  Breastfeeding is not recommended during treatment.

## 2023-01-23 ENCOUNTER — OFFICE VISIT (OUTPATIENT)
Dept: SLEEP MEDICINE | Facility: HOSPITAL | Age: 68
End: 2023-01-23
Payer: MEDICARE

## 2023-01-23 VITALS
SYSTOLIC BLOOD PRESSURE: 128 MMHG | HEIGHT: 73 IN | DIASTOLIC BLOOD PRESSURE: 78 MMHG | WEIGHT: 228 LBS | HEART RATE: 73 BPM | BODY MASS INDEX: 30.22 KG/M2 | OXYGEN SATURATION: 100 %

## 2023-01-23 DIAGNOSIS — G47.33 OSA (OBSTRUCTIVE SLEEP APNEA): Primary | ICD-10-CM

## 2023-01-23 PROCEDURE — G0463 HOSPITAL OUTPT CLINIC VISIT: HCPCS

## 2023-01-23 NOTE — PROGRESS NOTES
[facility]  Telephone: [phone] / Fax: [phone] [place]  Telephone: [phone] / Fax: [phone] [place]    Referring Physician: Vishnu Mccallum MD  PCP: Vishnu Mccallum MD    Reason for consult:  sleep apnea    Anjum Palma Jr is a 67 y.o.male  was seen in the Sleep Disorders Center today for evaluation of sleep apnea. He is a former patient of Dr Dobbs. Here today to get established with us for CHARLY care. Machine's pressures are comfortable. Device is set at 5-15cm H2O. He feels that pressures could be a little higher.  His sleep schedule is MN-8am. ESS is 1. He has history of prostate cancer. Recent metastasis to the sacrum requiring radiation. He completed radiation in October 2022.    SH- drinks 6-10 alc per week, 0 caffeine per day    ROS- negative    Anjum Palma Jr  has a past medical history of Degeneration of cervical intervertebral disc, Hypertension, LBBB (left bundle branch block), Nephrolithiasis, CHARLY on CPAP, Osteoarthritis, Persistent atrial fibrillation (HCC), Prostate cancer (HCC), and Seasonal allergic reaction.    Current Medications:    Current Outpatient Medications:   •  acetaminophen (TYLENOL) 650 MG 8 hr tablet, Take 1,300 mg by mouth 2 (Two) Times a Day., Disp: , Rfl:   •  amLODIPine (NORVASC) 5 MG tablet, Take 1 tablet by mouth Daily., Disp: 90 tablet, Rfl: 3  •  azelastine (ASTELIN) 0.1 % nasal spray, 2 sprays into the nostril(s) as directed by provider 2 (Two) Times a Day. Use in each nostril as directed, Disp: , Rfl:   •  cyclobenzaprine (FLEXERIL) 10 MG tablet, Take 1 tablet by mouth daily as needed., Disp: , Rfl:   •  eplerenone (Inspra) 25 MG tablet, Take 1 tablet by mouth 2 (Two) Times a Day., Disp: 180 tablet, Rfl: 3  •  flunisolide (NASALIDE) 25 MCG/ACT (0.025%) solution nasal spray, into each nostril Daily., Disp: , Rfl:   •  HM LIDOCAINE PATCH EX, Apply  topically As Needed., Disp: , Rfl:   •  hydrochlorothiazide  "(HYDRODIURIL) 25 MG tablet, Take 1 tablet by mouth daily., Disp: , Rfl:   •  levocetirizine (XYZAL) 5 MG tablet, Take 5 mg by mouth Every Evening., Disp: , Rfl:   •  losartan (COZAAR) 50 MG tablet, Take 1 tablet by mouth 2 (Two) Times a Day., Disp: 180 tablet, Rfl: 3  •  metoprolol succinate XL (TOPROL-XL) 50 MG 24 hr tablet, Take 1 tablet by mouth Daily., Disp: 90 tablet, Rfl: 3  •  montelukast (SINGULAIR) 10 MG tablet, Take 1 tablet by mouth daily., Disp: , Rfl:   •  Potassium Citrate ER 15 MEQ (1620 MG) tablet controlled-release, Take 1 tablet by mouth 3 (Three) Times a Day., Disp: , Rfl:   •  rivaroxaban (Xarelto) 20 MG tablet, Take 1 tablet by mouth Daily With Dinner., Disp: 90 tablet, Rfl: 3  •  tamsulosin (FLOMAX) 0.4 MG capsule 24 hr capsule, Take 0.4 mg by mouth Daily., Disp: , Rfl:     I have reviewed Past Medical History, Past Surgical History, Medication List, Social History and Family History as entered in Sleep Questionnaire and EPIC.    ESS  1   Vital Signs /78   Pulse 73   Ht 185.4 cm (73\")   Wt 103 kg (228 lb)   SpO2 100%   BMI 30.08 kg/m²  Body mass index is 30.08 kg/m².    General Alert and oriented. No acute distress noted   Pharynx/Throat Class   Mallampati airway, large tongue, no evidence of redundant lateral pharyngeal tissue. No oral lesions. No thrush. Moist mucous membranes.   Head Normocephalic. Symmetrical. Atraumatic.    Nose No septal deviation. No drainage   Chest Wall Normal shape. Symmetric expansion with respiration. No tenderness.   Neck Trachea midline, no thyromegaly or adenopathy    Lungs Clear to auscultation bilaterally. No wheezes. No rhonchi. No rales. Respirations regular, even and unlabored.   Heart Regular rhythm and normal rate. Normal S1 and S2. No murmur   Abdomen Soft, non-tender and non-distended. Normal bowel sounds. No masses.   Extremities Moves all extremities well. No edema   Psychiatric Normal mood and affect.       Download dates 12/20/22-1/18/23- " 100% use with average nightly use of 8 hours and 12 minutes on auto CPAP 5-15cm H2O, avg pressure is 10.1cm H2O, AHI 7.8/hr.    Study-Polysomnography in the past has revealed apnea-hypopnea index of AHI 24.2 per sleep hour with minimum SpO2 of 70%. He is on auto CPAP therapy. During supine position he has very severe obstructive sleep apnea syndrome with Supine AHI of 30.3, in nonsupine position 3.5 per sleep hour, and during REM sleep AHI of 68.1 per sleep hour. He also has periodic limb movement disorder.      Impression:  1. CHARLY (obstructive sleep apnea)          Plan:  Anjum is doing great on auto CPAP. His residual AHI on current download is 7.8. I would like to increase pressures to 8-15cm H2O to see if it helps improve AHI. His mask fits well and he is unaware of air leak or dry mouth. I reviewed original sleep study and recent download report with patient. He uses the machine and benefits from its use in terms of reduction of hypersomnia and snoring. If current pressures feel too high, he was asked to use the ramp feature set at 6cm H2O for 30 minutes.    I asked Anjum to f/u with Dr Witt in 6 months.      I appreciate the opportunity to participate in this patient's care.      JUAN CARLOS Lira  [facility]  Phone: [phone]      Part of this note may be an electronic transcription/translation of spoken language to printed text using the Dragon Dictation System. Some errors may exist even though the document was edited.

## 2023-08-22 ENCOUNTER — HOSPITAL ENCOUNTER (OUTPATIENT)
Dept: CARDIOLOGY | Facility: HOSPITAL | Age: 68
Discharge: HOME | End: 2023-08-22
Payer: MEDICARE

## 2023-08-22 ENCOUNTER — TRANSCRIBE ORDERS (OUTPATIENT)
Dept: CARDIOLOGY | Facility: HOSPITAL | Age: 68
End: 2023-08-22
Payer: MEDICARE

## 2023-08-22 ENCOUNTER — TRANSCRIBE ORDERS (OUTPATIENT)
Dept: LAB | Facility: HOSPITAL | Age: 68
End: 2023-08-22
Payer: MEDICARE

## 2023-08-22 ENCOUNTER — LAB (OUTPATIENT)
Dept: LAB | Facility: HOSPITAL | Age: 68
End: 2023-08-22
Payer: MEDICARE

## 2023-08-22 DIAGNOSIS — Z12.5 ENCOUNTER FOR SCREENING FOR MALIGNANT NEOPLASM OF PROSTATE: ICD-10-CM

## 2023-08-22 DIAGNOSIS — Z01.811 PRE-OP CHEST EXAM: Primary | ICD-10-CM

## 2023-08-22 DIAGNOSIS — C61 PROSTATE CANCER: ICD-10-CM

## 2023-08-22 DIAGNOSIS — C61 PROSTATE CANCER: Primary | ICD-10-CM

## 2023-08-22 LAB
ANION GAP SERPL CALCULATED.3IONS-SCNC: 11 MMOL/L (ref 5–15)
BUN SERPL-MCNC: 15 MG/DL (ref 8–23)
BUN/CREAT SERPL: 13.5 (ref 7–25)
CALCIUM SPEC-SCNC: 9.5 MG/DL (ref 8.6–10.5)
CHLORIDE SERPL-SCNC: 102 MMOL/L (ref 98–107)
CO2 SERPL-SCNC: 28 MMOL/L (ref 22–29)
CREAT SERPL-MCNC: 1.11 MG/DL (ref 0.76–1.27)
DEPRECATED RDW RBC AUTO: 40.6 FL (ref 37–54)
EGFRCR SERPLBLD CKD-EPI 2021: 72.3 ML/MIN/1.73
ERYTHROCYTE [DISTWIDTH] IN BLOOD BY AUTOMATED COUNT: 12.3 % (ref 12.3–15.4)
GLUCOSE SERPL-MCNC: 92 MG/DL (ref 65–99)
HCT VFR BLD AUTO: 48.7 % (ref 37.5–51)
HGB BLD-MCNC: 16.6 G/DL (ref 13–17.7)
MCH RBC QN AUTO: 30.6 PG (ref 26.6–33)
MCHC RBC AUTO-ENTMCNC: 34.1 G/DL (ref 31.5–35.7)
MCV RBC AUTO: 89.7 FL (ref 79–97)
PLATELET # BLD AUTO: 263 10*3/MM3 (ref 140–450)
PMV BLD AUTO: 10 FL (ref 6–12)
POTASSIUM SERPL-SCNC: 4.2 MMOL/L (ref 3.5–5.2)
PSA SERPL-MCNC: 11.2 NG/ML (ref 0–4)
QT INTERVAL: 442 MS
RBC # BLD AUTO: 5.43 10*6/MM3 (ref 4.14–5.8)
SODIUM SERPL-SCNC: 141 MMOL/L (ref 136–145)
WBC NRBC COR # BLD: 5.9 10*3/MM3 (ref 3.4–10.8)

## 2023-08-22 PROCEDURE — 80048 BASIC METABOLIC PNL TOTAL CA: CPT

## 2023-08-22 PROCEDURE — 85027 COMPLETE CBC AUTOMATED: CPT

## 2023-08-22 PROCEDURE — 93005 ELECTROCARDIOGRAM TRACING: CPT

## 2023-08-22 PROCEDURE — 36415 COLL VENOUS BLD VENIPUNCTURE: CPT

## 2023-08-22 PROCEDURE — G0103 PSA SCREENING: HCPCS

## 2023-10-04 ENCOUNTER — OFFICE VISIT (OUTPATIENT)
Dept: CARDIOLOGY | Facility: CLINIC | Age: 68
End: 2023-10-04
Payer: MEDICARE

## 2023-10-04 VITALS
HEART RATE: 83 BPM | SYSTOLIC BLOOD PRESSURE: 140 MMHG | DIASTOLIC BLOOD PRESSURE: 86 MMHG | WEIGHT: 229 LBS | HEIGHT: 73 IN | BODY MASS INDEX: 30.35 KG/M2

## 2023-10-04 DIAGNOSIS — G47.33 OSA (OBSTRUCTIVE SLEEP APNEA): ICD-10-CM

## 2023-10-04 DIAGNOSIS — I48.11 LONGSTANDING PERSISTENT ATRIAL FIBRILLATION: Primary | ICD-10-CM

## 2023-10-04 DIAGNOSIS — I10 PRIMARY HYPERTENSION: ICD-10-CM

## 2023-10-04 PROCEDURE — 99214 OFFICE O/P EST MOD 30 MIN: CPT | Performed by: INTERNAL MEDICINE

## 2023-10-04 PROCEDURE — 93000 ELECTROCARDIOGRAM COMPLETE: CPT | Performed by: INTERNAL MEDICINE

## 2023-10-04 NOTE — PROGRESS NOTES
Date of Office Visit: 10/04/23  Encounter Provider: Manuel English MD  Place of Service: [facility]  Patient Name: Anjum Palma Jr  :1955  [id number]    Chief Complaint   Patient presents with    Atrial Fibrillation        HPI: Anjum Palma Jr is a 68 y.o. male he is a patient previously had seen Layton Reynolds.  He has asymptomatic longstanding persistent A-fib at one time he was cardioverted once but he went back into it and he has been managed with rate control and anticoagulation.  For some reason he had a calcium score in  and it was 14.  He has a history of hypertension and left bundle branch block and echo in  showed normal LV function.    He is here for follow-up today.  He is doing well no palpitations no bleeding difficulty no PND orthopnea edema no chest pain or shortness of breath.    Past Medical History:   Diagnosis Date    Degeneration of cervical intervertebral disc     Hypertension     LBBB (left bundle branch block)     Diagnosed 3/14/17    Nephrolithiasis     Recurrent, s/p multiple lithotripsy procedures    CHARLY on CPAP     Osteoarthritis     Persistent atrial fibrillation     Prostate cancer     Early stage prostate cancer diagnosed late .  Completed 43 radiation treatments on 5/18/15.    Seasonal allergic reaction        Past Surgical History:   Procedure Laterality Date    OTHER SURGICAL HISTORY      Multiple Lithotripsy procedures       Social History     Socioeconomic History    Marital status:    Tobacco Use    Smoking status: Never    Smokeless tobacco: Never    Tobacco comments:     caffeine use- rare   Vaping Use    Vaping Use: Never used   Substance and Sexual Activity    Alcohol use: Yes     Comment: Cut back on use significantly/  occ    Drug use: No       Family History   Problem Relation Age of Onset    Atrial fibrillation Mother          also s/p ICD placement    Coronary artery disease Father         Father  with CABG in his 50's, and had subsequent coronary stent placement.    Uterine cancer Sister     Heart failure Paternal Uncle        Review of Systems   Constitutional: Negative for decreased appetite, fever, malaise/fatigue and weight loss.   HENT:  Negative for nosebleeds.    Eyes:  Negative for double vision.   Cardiovascular:  Negative for chest pain, claudication, cyanosis, dyspnea on exertion, irregular heartbeat, leg swelling, near-syncope, orthopnea, palpitations, paroxysmal nocturnal dyspnea and syncope.   Respiratory:  Negative for cough, hemoptysis and shortness of breath.    Hematologic/Lymphatic: Negative for bleeding problem.   Skin:  Negative for rash.   Musculoskeletal:  Negative for falls and myalgias.   Gastrointestinal:  Negative for hematochezia, jaundice, melena, nausea and vomiting.   Genitourinary:  Negative for hematuria.   Neurological:  Negative for dizziness and seizures.   Psychiatric/Behavioral:  Negative for altered mental status and memory loss.      No Known Allergies      Current Outpatient Medications:     acetaminophen (TYLENOL) 650 MG 8 hr tablet, Take 2 tablets by mouth 2 (Two) Times a Day., Disp: , Rfl:     amLODIPine (NORVASC) 5 MG tablet, Take 1 tablet by mouth Daily., Disp: 90 tablet, Rfl: 3    azelastine (ASTELIN) 0.1 % nasal spray, 2 sprays into the nostril(s) as directed by provider 2 (Two) Times a Day. Use in each nostril as directed, Disp: , Rfl:     cyclobenzaprine (FLEXERIL) 10 MG tablet, Take 1 tablet by mouth Daily As Needed., Disp: , Rfl:     eplerenone (Inspra) 25 MG tablet, Take 1 tablet by mouth 2 (Two) Times a Day., Disp: 180 tablet, Rfl: 3    flunisolide (NASALIDE) 25 MCG/ACT (0.025%) solution nasal spray, into each nostril Daily., Disp: , Rfl:     HM LIDOCAINE PATCH EX, Apply  topically As Needed., Disp: , Rfl:     hydrochlorothiazide (HYDRODIURIL) 25 MG tablet, Take 1 tablet by mouth Daily., Disp: , Rfl:     levocetirizine (XYZAL) 5 MG tablet, Take 1 tablet  "by mouth Every Evening., Disp: , Rfl:     losartan (COZAAR) 50 MG tablet, Take 1 tablet by mouth 2 (Two) Times a Day., Disp: 180 tablet, Rfl: 3    metoprolol succinate XL (TOPROL-XL) 50 MG 24 hr tablet, Take 1 tablet by mouth Daily., Disp: 90 tablet, Rfl: 3    montelukast (SINGULAIR) 10 MG tablet, Take 1 tablet by mouth Daily., Disp: , Rfl:     Potassium Citrate ER 15 MEQ (1620 MG) tablet controlled-release, Take 1 tablet by mouth 3 (Three) Times a Day., Disp: , Rfl:     rivaroxaban (Xarelto) 20 MG tablet, Take 1 tablet by mouth Daily With Dinner., Disp: 90 tablet, Rfl: 3    tamsulosin (FLOMAX) 0.4 MG capsule 24 hr capsule, Take 1 capsule by mouth Daily., Disp: , Rfl:       Objective:     Vitals:    10/04/23 1023   BP: 140/86   Pulse: 83   Weight: 104 kg (229 lb)   Height: 185.4 cm (73\")     Body mass index is 30.21 kg/m².    Constitutional:       Appearance: Well-developed.   Eyes:      General: No scleral icterus.  HENT:      Head: Normocephalic.   Neck:      Thyroid: No thyromegaly.      Vascular: No JVD.      Lymphadenopathy: No cervical adenopathy.   Pulmonary:      Effort: Pulmonary effort is normal.      Breath sounds: Normal breath sounds. No wheezing. No rales.   Cardiovascular:      Normal rate. Irregularly irregular rhythm.      No gallop.    Edema:     Peripheral edema absent.   Abdominal:      Palpations: Abdomen is soft.      Tenderness: There is no abdominal tenderness.   Musculoskeletal: Normal range of motion. Skin:     General: Skin is warm and dry.      Findings: No rash.   Neurological:      Mental Status: Alert and oriented to person, place, and time.         ECG 12 Lead    Date/Time: 10/4/2023 10:42 AM  Performed by: Manuel English MD  Authorized by: Manuel English MD   Comparison: compared with previous ECG   Similar to previous ECG  Rhythm: atrial fibrillation  Conduction: left bundle branch block    Clinical impression: abnormal EKG         Assessment:       Diagnosis Plan   1. " Longstanding persistent atrial fibrillation        2. CHARLY (obstructive sleep apnea)        3. Primary hypertension               Plan:       I think he is doing pretty well the main focus for him right now is dealing with his prostate cancer.  He is doing well from a cardiac standpoint we can take a rate control and anticoagulation approach with his A-fib.  He has chronic left bundle branch block that is unchanged.  His blood pressure is doing well.  I did encourage him to do some regular activity and that he gets it put on hormone blocking medicine try to get a little bit of strength training in.  I will have him come back and see us in a year sooner if he has trouble    Return for See Mary Obrien in 1 year, See me in 2 years.     As always, it has been a pleasure to participate in your patient's care.      Sincerely,       Manuel English MD

## 2023-10-16 RX ORDER — AMLODIPINE BESYLATE 5 MG/1
5 TABLET ORAL DAILY
Qty: 90 TABLET | Refills: 0 | Status: SHIPPED | OUTPATIENT
Start: 2023-10-16

## 2023-10-24 RX ORDER — RIVAROXABAN 20 MG/1
20 TABLET, FILM COATED ORAL
Qty: 90 TABLET | Refills: 1 | Status: SHIPPED | OUTPATIENT
Start: 2023-10-24

## 2023-12-15 RX ORDER — METOPROLOL SUCCINATE 50 MG/1
50 TABLET, EXTENDED RELEASE ORAL DAILY
Qty: 90 TABLET | Refills: 3 | Status: SHIPPED | OUTPATIENT
Start: 2023-12-15

## 2023-12-18 RX ORDER — LOSARTAN POTASSIUM 50 MG/1
50 TABLET ORAL 2 TIMES DAILY
Qty: 180 TABLET | Refills: 3 | Status: SHIPPED | OUTPATIENT
Start: 2023-12-18

## 2024-01-15 RX ORDER — AMLODIPINE BESYLATE 5 MG/1
5 TABLET ORAL DAILY
Qty: 90 TABLET | Refills: 0 | Status: SHIPPED | OUTPATIENT
Start: 2024-01-15

## 2024-01-15 RX ORDER — EPLERENONE 25 MG/1
25 TABLET, FILM COATED ORAL 2 TIMES DAILY
Qty: 180 TABLET | Refills: 3 | Status: SHIPPED | OUTPATIENT
Start: 2024-01-15

## 2024-02-02 ENCOUNTER — OFFICE VISIT (OUTPATIENT)
Dept: SLEEP MEDICINE | Facility: HOSPITAL | Age: 69
End: 2024-02-02
Payer: MEDICARE

## 2024-02-02 ENCOUNTER — TELEPHONE (OUTPATIENT)
Dept: SLEEP MEDICINE | Facility: HOSPITAL | Age: 69
End: 2024-02-02
Payer: MEDICARE

## 2024-02-02 VITALS
BODY MASS INDEX: 31.78 KG/M2 | DIASTOLIC BLOOD PRESSURE: 90 MMHG | HEART RATE: 83 BPM | OXYGEN SATURATION: 99 % | WEIGHT: 239.8 LBS | SYSTOLIC BLOOD PRESSURE: 131 MMHG | HEIGHT: 73 IN

## 2024-02-02 DIAGNOSIS — E66.9 OBESITY (BMI 30-39.9): ICD-10-CM

## 2024-02-02 DIAGNOSIS — G47.33 OSA (OBSTRUCTIVE SLEEP APNEA): Primary | ICD-10-CM

## 2024-02-02 PROCEDURE — G0463 HOSPITAL OUTPT CLINIC VISIT: HCPCS

## 2024-02-02 NOTE — PROGRESS NOTES
[facility]  Telephone: [phone] / Fax: [phone] [place]  Telephone: [phone] / Fax: [phone] [place]    PCP: Vishnu Mccallum MD    Reason for visit: CHARLY f/u    Anjum Palma Jr is a 68 y.o.male  was last seen at [facility] sleep lab 1 year ago.  He is doing well on the CPAP machine. Finds current pressures comfortable. Device is set at 8-15cm H2O, avg pr is 10cm H2O. Full face mask fits well and he is unaware of leaks or dry mouth. He is being treated for prostate ca stage IV.    SH- no tobacco, drinks 10-12 alc per week, no caffeine, no energy drinks.    ROS- negative.    DME [employer]    Current Medications:    Current Outpatient Medications:     acetaminophen (TYLENOL) 650 MG 8 hr tablet, Take 2 tablets by mouth 2 (Two) Times a Day., Disp: , Rfl:     amLODIPine (NORVASC) 5 MG tablet, TAKE 1 TABLET BY MOUTH EVERY DAY, Disp: 90 tablet, Rfl: 0    azelastine (ASTELIN) 0.1 % nasal spray, 2 sprays into the nostril(s) as directed by provider 2 (Two) Times a Day. Use in each nostril as directed, Disp: , Rfl:     cyclobenzaprine (FLEXERIL) 10 MG tablet, Take 1 tablet by mouth Daily As Needed., Disp: , Rfl:     eplerenone (INSPRA) 25 MG tablet, TAKE 1 TABLET BY MOUTH TWICE A DAY, Disp: 180 tablet, Rfl: 3    flunisolide (NASALIDE) 25 MCG/ACT (0.025%) solution nasal spray, into each nostril Daily., Disp: , Rfl:     HM LIDOCAINE PATCH EX, Apply  topically As Needed., Disp: , Rfl:     hydrochlorothiazide (HYDRODIURIL) 25 MG tablet, Take 1 tablet by mouth Daily., Disp: , Rfl:     levocetirizine (XYZAL) 5 MG tablet, Take 1 tablet by mouth Every Evening., Disp: , Rfl:     losartan (COZAAR) 50 MG tablet, TAKE 1 TABLET BY MOUTH TWICE A DAY, Disp: 180 tablet, Rfl: 3    metoprolol succinate XL (TOPROL-XL) 50 MG 24 hr tablet, TAKE 1 TABLET BY MOUTH EVERY DAY, Disp: 90 tablet, Rfl: 3    montelukast (SINGULAIR) 10 MG tablet, Take 1 tablet by mouth Daily., Disp: , Rfl:     Potassium Citrate ER 15  "MEQ (1620 MG) tablet controlled-release, Take 1 tablet by mouth 3 (Three) Times a Day., Disp: , Rfl:     rivaroxaban (Xarelto) 20 MG tablet, TAKE 1 TABLET BY MOUTH EVERY DAY WITH DINNER, Disp: 90 tablet, Rfl: 1    tamsulosin (FLOMAX) 0.4 MG capsule 24 hr capsule, Take 1 capsule by mouth Daily., Disp: , Rfl:    also entered in Sleep Questionnaire    Patient  has a past medical history of Degeneration of cervical intervertebral disc, Hypertension, LBBB (left bundle branch block), Nephrolithiasis, CHARLY on CPAP, Osteoarthritis, Persistent atrial fibrillation, Prostate cancer, and Seasonal allergic reaction.    I have reviewed the Past Medical History, Past Surgical History, Social History and Family History.    Vital Signs /90   Pulse 83   Ht 185.4 cm (73\")   Wt 109 kg (239 lb 12.8 oz)   SpO2 99%   BMI 31.64 kg/m²  Body mass index is 31.64 kg/m².    General Alert and oriented. No acute distress noted   Pharynx/Throat Class IV Mallampati airway, large tongue, no evidence of redundant lateral pharyngeal tissue. No oral lesions. No thrush. Moist mucous membranes.   Head Normocephalic. Symmetrical. Atraumatic.    Nose No septal deviation. No drainage   Chest Wall Normal shape. Symmetric expansion with respiration. No tenderness.   Neck Trachea midline, no thyromegaly or adenopathy    Lungs Clear to auscultation bilaterally. No wheezes. No rhonchi. No rales. Respirations regular, even and unlabored.   Heart Regular rhythm and normal rate. Normal S1 and S2. No murmur   Abdomen Soft, non-tender and non-distended. Normal bowel sounds. No masses.   Extremities Moves all extremities well. No edema   Psychiatric Normal mood and affect.     Testing:  Download 11/2/23-1/30/24, 100% use with average nightly use of 8 hours and 18 minutes on auto CPAP 8-15cm H2O, avg pressure is 10cm H2O, AHI is 6.9/hr.    Study-Polysomnography in the past has revealed apnea-hypopnea index of AHI 24.2 per sleep hour with minimum SpO2 of 70%. " He is on auto CPAP therapy. During supine position he has very severe obstructive sleep apnea syndrome with Supine AHI of 30.3, in nonsupine position 3.5 per sleep hour, and during REM sleep AHI of 68.1 per sleep hour. He also has periodic limb movement disorder.     Impression:  1. CHARLY (obstructive sleep apnea)    2. Obesity (BMI 30-39.9)          Plan:  Change pressure to 10-15cm H2O in view of slightly elevated AHI. Renew order for CPAP supplies.  I reviewed download report and original sleep study report with the patient. I advised pt to contact us if PAP pressures feel excessive or insufficient.    Weight loss will be strongly beneficial to reduce the severity of sleep-disordered breathing.        Follow up with Dr. Witt in one year    Thank you for allowing me to participate in your patient's care.      JUAN CARLOS Lira  [facility]  Phone: [phone]      Part of this note may be an electronic transcription/translation of spoken language to printed text using the Dragon Dictation System.

## 2024-03-11 RX ORDER — RIVAROXABAN 20 MG/1
20 TABLET, FILM COATED ORAL
Qty: 90 TABLET | Refills: 1 | Status: SHIPPED | OUTPATIENT
Start: 2024-03-11

## 2024-03-24 NOTE — PROGRESS NOTES
"Sleep clinic follow-up visit    Anjum Palma Jr  :1955   [id number]    DATE OF SERVICE: 2021     HISTORY: The patient is a 65 y.o. white male with moderately severe obstructive sleep apnea syndrome.     Polysomnography in the past has revealed apnea-hypopnea index of AHI 24.2 per sleep hour with minimum SpO2 of 70%. He is on auto CPAP therapy. During supine position he has very severe obstructive sleep apnea syndrome with Supine AHI of 30.3, in nonsupine position 3.5 per sleep hour, and during REM sleep AHI of 68.1 per sleep hour. He also has periodic limb movement disorder.     Now has a correct fitting mask and using auto CPAP therapy very regularly. Compliance data indicates excellent compliance with CPAP mean pressure of 8.4 cm of water with an average usage 8 hours and 31 minutes and using 100% of the time for more than 4 hours. He is compliant and benefiting from it.  Residual AHI 9.  The patient is compliant and benefiting from CPAP therapy.  The patient's [place] Sleepiness Scale score is 4 with CPAP therapy.    Full face mask fits good.  DME company: [employer]    PMH, PSH, Medications, allergies, FH, SH are reviewed and updated in the chart.     10 Review of Systems - Negative except for cough.     PHYSICAL EXAMINATION:  Vitals:    21 0817   BP: (!) 84/61   Pulse: 52   SpO2: 91%   Weight: 106 kg (234 lb)   Height: 185.4 cm (73\")     HEENT: Narrow oropharynx. Mallampati class III.   NECK: Supple. No bruits.   CARDIAC: Normal.   LUNGS: Clear to auscultation.   EXTREMITIES: No edema.     IMPRESSION: Patient with severe obstructive sleep apnea syndrome successfully treated with auto CPAP therapy and is compliant and benefiting from it.  The patient had a new auto CPAP machine in 2020 and is functioning well and the patient is compliant and benefiting from it.  The patient blood pressure was low and I have advised him to see his PCP as soon as possible for titration regarding his " antihypertensive medications.    RECOMMENDATIONS: Continue present auto CPAP we will request for repair or replacement of current auto CPAP machine as the water chamber is broken. Followup in 1 year.     Cristi Dobbs M.D.  1/19/2021    Yes Yes Yes Yes Yes

## 2024-04-11 RX ORDER — AMLODIPINE BESYLATE 5 MG/1
5 TABLET ORAL DAILY
Qty: 90 TABLET | Refills: 0 | Status: SHIPPED | OUTPATIENT
Start: 2024-04-11

## 2024-07-15 RX ORDER — AMLODIPINE BESYLATE 5 MG/1
5 TABLET ORAL DAILY
Qty: 90 TABLET | Refills: 0 | Status: SHIPPED | OUTPATIENT
Start: 2024-07-15

## 2024-10-14 ENCOUNTER — OFFICE VISIT (OUTPATIENT)
Dept: CARDIOLOGY | Facility: CLINIC | Age: 69
End: 2024-10-14
Payer: MEDICARE

## 2024-10-14 VITALS
SYSTOLIC BLOOD PRESSURE: 150 MMHG | HEIGHT: 73 IN | DIASTOLIC BLOOD PRESSURE: 95 MMHG | WEIGHT: 246.6 LBS | BODY MASS INDEX: 32.68 KG/M2 | OXYGEN SATURATION: 99 %

## 2024-10-14 DIAGNOSIS — I44.7 LBBB (LEFT BUNDLE BRANCH BLOCK): ICD-10-CM

## 2024-10-14 DIAGNOSIS — I48.11 LONGSTANDING PERSISTENT ATRIAL FIBRILLATION: Primary | ICD-10-CM

## 2024-10-14 DIAGNOSIS — G47.33 OSA (OBSTRUCTIVE SLEEP APNEA): ICD-10-CM

## 2024-10-14 DIAGNOSIS — I10 PRIMARY HYPERTENSION: ICD-10-CM

## 2024-10-14 PROCEDURE — 3077F SYST BP >= 140 MM HG: CPT | Performed by: NURSE PRACTITIONER

## 2024-10-14 PROCEDURE — 99214 OFFICE O/P EST MOD 30 MIN: CPT | Performed by: NURSE PRACTITIONER

## 2024-10-14 PROCEDURE — 3080F DIAST BP >= 90 MM HG: CPT | Performed by: NURSE PRACTITIONER

## 2024-10-14 PROCEDURE — 93000 ELECTROCARDIOGRAM COMPLETE: CPT | Performed by: NURSE PRACTITIONER

## 2024-10-14 RX ORDER — LOSARTAN POTASSIUM 50 MG/1
50 TABLET ORAL 2 TIMES DAILY
Qty: 180 TABLET | Refills: 3 | Status: SHIPPED | OUTPATIENT
Start: 2024-10-14

## 2024-10-14 RX ORDER — AMLODIPINE BESYLATE 5 MG/1
5 TABLET ORAL DAILY
Qty: 90 TABLET | Refills: 3 | Status: SHIPPED | OUTPATIENT
Start: 2024-10-14

## 2024-10-14 RX ORDER — METOPROLOL SUCCINATE 50 MG/1
50 TABLET, EXTENDED RELEASE ORAL DAILY
Qty: 90 TABLET | Refills: 3 | Status: SHIPPED | OUTPATIENT
Start: 2024-10-14

## 2024-10-14 RX ORDER — EPLERENONE 25 MG/1
25 TABLET, FILM COATED ORAL DAILY
Qty: 90 TABLET | Refills: 3 | Status: SHIPPED | OUTPATIENT
Start: 2024-10-14

## 2024-10-14 NOTE — ASSESSMENT & PLAN NOTE
BP controlled at home at 130s/80s, slightly elevated in clinic but typically elevated in doctors visits  Continue the following regimen:  Losartan 50 mg twice daily  Inspra 25 mg/day  Amlodipine 5 mg/day  I have reviewed most recent chemistry panel where renal function and electrolytes are stable

## 2024-10-14 NOTE — ASSESSMENT & PLAN NOTE
Patient asymptomatic  Heart rate controlled  Continue the following regimen:  Metoprolol succinate 50 mg/day  Rivaroxaban 20 mg/day

## 2025-01-28 ENCOUNTER — OFFICE VISIT (OUTPATIENT)
Dept: SLEEP MEDICINE | Facility: HOSPITAL | Age: 70
End: 2025-01-28
Payer: MEDICARE

## 2025-01-28 VITALS
SYSTOLIC BLOOD PRESSURE: 146 MMHG | BODY MASS INDEX: 32.47 KG/M2 | OXYGEN SATURATION: 98 % | WEIGHT: 245 LBS | HEIGHT: 73 IN | DIASTOLIC BLOOD PRESSURE: 79 MMHG | HEART RATE: 75 BPM

## 2025-01-28 DIAGNOSIS — G47.33 OBSTRUCTIVE SLEEP APNEA, ADULT: Primary | ICD-10-CM

## 2025-01-28 DIAGNOSIS — Z78.9 REGULAR ALCOHOL CONSUMPTION: ICD-10-CM

## 2025-01-28 DIAGNOSIS — E66.9 OBESITY (BMI 30-39.9): ICD-10-CM

## 2025-01-28 DIAGNOSIS — I48.11 LONGSTANDING PERSISTENT ATRIAL FIBRILLATION: ICD-10-CM

## 2025-01-28 PROCEDURE — G0463 HOSPITAL OUTPT CLINIC VISIT: HCPCS

## 2025-01-28 NOTE — PROGRESS NOTES
"[facility]  [address]  [facility] [address] [phone]    PCP: Vishnu Mccallum MD    Reason for visit:  Sleep disorders: CHARLY    Anjum \"Ed\" is a 69 y.o.male who was seen in the Sleep Disorders Center today. Annual fu. He is doing well. Pr was changed last visit and he tolerated. He uses ffm. FIts well and no air leaks. Sleeps from 11pm to 7am. No daytime sleepiness. Has replacement device from [employer].  [place] Sleepiness Scale is 2. Caffeine 0 per day. Alcohol 10-12 per week.    Anjum \"Ed\"  reports that he has never smoked. He has never used smokeless tobacco.    Pertinent Positive Review of Systems of PND  Rest of Review of Systems was negative as recorded in Sleep Questionnaire.    Patient  has a past medical history of Degeneration of cervical intervertebral disc, Hypertension, LBBB (left bundle branch block), Nephrolithiasis, CHARLY on CPAP, Osteoarthritis, Persistent atrial fibrillation, Prostate cancer, and Seasonal allergic reaction.     Current Medications:    Current Outpatient Medications:     acetaminophen (TYLENOL) 650 MG 8 hr tablet, Take 2 tablets by mouth 2 (Two) Times a Day., Disp: , Rfl:     amLODIPine (NORVASC) 5 MG tablet, Take 1 tablet by mouth Daily., Disp: 90 tablet, Rfl: 3    azelastine (ASTELIN) 0.1 % nasal spray, Administer 2 sprays into the nostril(s) as directed by provider 2 (Two) Times a Day. Use in each nostril as directed, Disp: , Rfl:     cyclobenzaprine (FLEXERIL) 10 MG tablet, Take 1 tablet by mouth Daily As Needed., Disp: , Rfl:     eplerenone (INSPRA) 25 MG tablet, Take 1 tablet by mouth Daily., Disp: 90 tablet, Rfl: 3    flunisolide (NASALIDE) 25 MCG/ACT (0.025%) solution nasal spray, into each nostril Daily., Disp: , Rfl:     HM LIDOCAINE PATCH EX, Apply  topically As Needed., Disp: , Rfl:     hydrochlorothiazide (HYDRODIURIL) 25 MG tablet, Take 1 tablet by mouth Daily., Disp: , Rfl:     levocetirizine (XYZAL) 5 MG tablet, " "Take 1 tablet by mouth Every Evening., Disp: , Rfl:     losartan (COZAAR) 50 MG tablet, Take 1 tablet by mouth 2 (Two) Times a Day., Disp: 180 tablet, Rfl: 3    metoprolol succinate XL (TOPROL-XL) 50 MG 24 hr tablet, Take 1 tablet by mouth Daily., Disp: 90 tablet, Rfl: 3    montelukast (SINGULAIR) 10 MG tablet, Take 1 tablet by mouth Daily., Disp: , Rfl:     Potassium Citrate ER 15 MEQ (1620 MG) tablet controlled-release, Take 1 tablet by mouth 3 (Three) Times a Day., Disp: , Rfl:     rivaroxaban (Xarelto) 20 MG tablet, Take 1 tablet by mouth Daily With Dinner., Disp: 90 tablet, Rfl: 3    tamsulosin (FLOMAX) 0.4 MG capsule 24 hr capsule, Take 1 capsule by mouth Daily., Disp: , Rfl:    also entered in Sleep Questionnaire         Vital Signs: /79   Pulse 75   Ht 185.4 cm (73\")   Wt 111 kg (245 lb)   SpO2 98%   BMI 32.32 kg/m²     Body mass index is 32.32 kg/m².       Tongue: Normal       Dentition: good       Pharynx: Posterior pharyngeal pillars are wide   Mallampatti: I (soft palate, uvula, fauces, and tonsillar pillars visible)        General: Alert. Cooperative. Well developed. No acute distress.             Nose: No septal deviation. No drainage.          Throat: No oral lesions. No thrush. Moist mucous membranes.           Lungs:  Clear to auscultation bilaterally.            Heart:  Regular rhythm and normal rate. No murmur.     Diagnostic data available to date is as below and was reviewed on current visit:  Study-Polysomnography in the past has revealed apnea-hypopnea index of AHI 24.2 per sleep hour with minimum SpO2 of 70%. He is on auto CPAP therapy. During supine position he has very severe obstructive sleep apnea syndrome with Supine AHI of 30.3, in nonsupine position 3.5 per sleep hour, and during REM sleep AHI of 68.1 per sleep hour. He also has periodic limb movement disorder.     No results found for: \"IRON\", \"TIBC\", \"FERRITIN\"    Most current available usage data reviewed on " "01/28/2025:        [employer]: [employer]    [place] [facility] [place] below:    full face mask      Description Replacement    Nasal PILLOWS      A 7034 Nasal Pillows  every 3 mth    A 7033 Repl Nasal Pillows  2 per mth    Nasal MASK/CUSHION      A 7034 Nasal Mask/Cushion  every 3 mth    A 7032 Repl Nasal Mask/Cushion  2 per mth    Full Face MASK     x A 7030 Full Face Mask  every 3 mth   x A 7031 Repl Face Mask  1 per mth      A 4604 Heated Tubing  every 3 mth    A 7037 Standard Tubing  every 3 mth   x A 7035 Headgear  every 3 mth   x A 7046 Repl Humidifier Chamber  every 6 yrs   x A 7038 Disposable Filters  2 per mth   x A 7039 Non-disposable Filter  every 6 mth   x A 7036 Chin Strap  every 6 mth     Orders Placed This Encounter   Procedures    Pulmonary Results Scan          Impression:  1. Obstructive sleep apnea, adult    2. Obesity (BMI 30-39.9)    3. Longstanding persistent atrial fibrillation    4. Regular alcohol consumption        Plan:  Edward \"Ed\" is very compliant.  He sleeps well with the machine and change supplies regularly.    Has constant A-fib.  Very important to use the CPAP device consistently as he is doing.    Advised to cut down alcohol intake.  Would help with sleep and general and also A-fib.    I reiterated the importance of effective treatment of obstructive sleep apnea with PAP machine.  Cardiovascular health risks of untreated sleep apnea were again reviewed.  Patient was asked to remain cautious if there is persistent hypersomnolence. The benefit of weight loss in reducing severity of obstructive sleep apnea was discussed.  Patient would benefit from adhering to a strict diet to achieve ideal BMI.     Change of PAP supplies regularly is important for effective use.  Change of cushion on the mask or plugs on nasal pillows along with disposable filters once every month and change of mask frame, tubing, headgear and Velcro straps every 6 months at the minimum was " reiterated.    This patient is compliant with PAP machine and benefits from its use.  Apnea hypopneas index is corrected/improved.  Daytime hypersomnolence has resolved.     Patient will follow up in this clinic in 1 year APRN    Thank you for allowing me to participate in your patient's care.    Electronically signed by Abilio Witt MD, 01/28/25, 8:35 AM EST.    Part of this note may be an electronic transcription/translation of spoken language to printed text using the Dragon Dictation System.